# Patient Record
Sex: FEMALE | Race: WHITE | Employment: OTHER | URBAN - METROPOLITAN AREA
[De-identification: names, ages, dates, MRNs, and addresses within clinical notes are randomized per-mention and may not be internally consistent; named-entity substitution may affect disease eponyms.]

---

## 2019-11-11 ENCOUNTER — HOSPITAL ENCOUNTER (OUTPATIENT)
Dept: PHYSICAL THERAPY | Age: 64
Discharge: HOME | End: 2019-11-11
Payer: MEDICARE

## 2019-11-11 PROCEDURE — 97162 PT EVAL MOD COMPLEX 30 MIN: CPT

## 2019-11-11 PROCEDURE — 97140 MANUAL THERAPY 1/> REGIONS: CPT

## 2019-11-11 NOTE — PROGRESS NOTES
[facility] PHYSICAL THERAPY  [address] [place] [address] 51, Sena Proffer 201,Valentina Gavin, [address] [facility] [address] - Phone: [phone]  Fax: [phone] / [phone] [facility]  Patient Name: Pk Waggoner : 1955   Medical   Diagnosis: Low back pain [M54.5] Treatment Diagnosis: Low back pain [M54.5]   Onset Date: chronic     Referral Source: Juan J Parsons, * Start of Care [facility]): 2019   Prior Hospitalization: See medical history Provider #: [id number]   Prior Level of Function: Difficulty with prolonged walking/standing   Comorbidities: OA, HTN   Medications: Verified on Patient Summary List   The Plan of Care and following information is based on the information from the initial evaluation.   ===========================================================================================  Assessment / key information:  Pk Waggoner is a 75 y.o.  yo female with Dx of Low back pain [M54.5]. She reports chronic Hx of LBP. She currently rates her pain as 8/10 at worst, 0/10 at best, primarily located at lower lumbar region and posterolateral aspect of her R hip. She complains of difficulty and increase pain with prolonged walking and standing. .  Objective Findings:  Lumbar ROM: Flx  = limited by 30%, Ext = limited by 30%, Rot: R = limited by 30%, L = limited by 20%. Manual Muscle Testing: L hip abd and B hip ext are Reduced. Lumbar/SI Screening:  L iliosacral ant innominate, L2-4 L ERS, L on L SI innominate noted. Palpation:  Significantly increased soft tissue tension noted at B rectus femoris.  Pt instructed in HEP and will f/u in clinic for PT.  ===========================================================================================  Eval Complexity: History MEDIUM  Complexity : 1-2 comorbidities / personal factors will impact the outcome/ POC ;  Examination  MEDIUM Complexity : 3 Standardized tests and measures addressing body structure, function, activity limitation and / or participation in recreation ; Presentation MEDIUM Complexity : Evolving with changing characteristics ; Decision Making MEDIUM Complexity : FOTO score of 26-74; Overall Complexity MEDIUM  Problem List: pain affecting function, decrease ROM, decrease strength, decrease ADL/ functional abilitiies, decrease activity tolerance and decrease flexibility/ joint mobility   Treatment Plan may include any combination of the following: Therapeutic exercise, Therapeutic activities, Neuromuscular re-education, Physical agent/modality, Manual therapy, Patient education, Self Care training and Functional mobility training  Patient / Family readiness to learn indicated by: asking questions  Persons(s) to be included in education: patient (P)  Barriers to Learning/Limitations: None  Measures taken, if barriers to learning:    Patient Goal (s): Decrease pain    Patient self reported health status: good  Rehabilitation Potential: good     Short Term Goals: To be accomplished in  1-2  weeks:  1. Independent with HEP. 2. Decrease max pain 25-50% to assist with ADLs   Long Term Goals: To be accomplished in  3-4  weeks:  1. Decrease max pain 50-75% to assist with ADLs  2. Increase FOTO score to 63% to show functional improvment. 3.  Will rate  >/= +5 on Global Rating of Change and be prepared to DC to HEP. Frequency / Duration:   Patient to be seen  2-3  times per week for 3-4  weeks:  Patient / Caregiver education and instruction: self care and exercises    Therapist Signature: Joe Andrea, ANNEMARIE, OCS, SCS, CSCS Date: 31/21/7850   Certification Period: 11/11/19 - 2/8/20 Time: 12:21 PM   ===========================================================================================  I certify that the above Physical Therapy Services are being furnished while the patient is under my care. I agree with the treatment plan and certify that this therapy is necessary.     Physician Signature:        Date:       Time:     Please sign and return to [facility] or you may fax the signed copy to [phone]. Thank you.

## 2019-11-11 NOTE — PROGRESS NOTES
PHYSICAL THERAPY - DAILY TREATMENT NOTE    Patient Name: Michelle Cedillo        Date: 2019  : 1955   YES Patient  Verified  Visit #:     Insurance: Payor: [place] MEDICARE / Plan: [place] MEDICARE PART A & B / Product Type: Medicare /      In time: 11:30 Out time: 12:10   Total Treatment Time: 40     Medicare Time Tracking (below)   Total Timed Codes (min):  20 1:1 Treatment Time:  20     TREATMENT AREA =  Low back pain [M54.5]    SUBJECTIVE  Pain Level (on 0 to 10 scale):    Medication Changes/New allergies or changes in medical history, any new surgeries or procedures? NO    If yes, update Summary List   Subjective Functional Status/Changes:  []  No changes reported     SEE IE          OBJECTIVE      15 min Manual Therapy: CT and TL junction mob, L2-4 L ERS, L IS ant inn, L on L SI delfino   Rationale:      decrease pain, increase ROM and increase tissue extensibility to improve patient's ability to perform ADLs    5 min Self Care: Post pelvic tilt   Rationale:    increase ROM, increase strength and improve coordination to improve the patients ability to perform ADL      Billed With/As:   [] TE   [] TA   [] Neuro   [x] Self Care Patient Education: [x] Review HEP    [] Progressed/Changed HEP based on:   [] positioning   [] body mechanics   [] transfers   [] heat/ice application    [] other:       min Patient Education:  YES  Reviewed HEP   []  Progressed/Changed HEP based on:         Other Objective/Functional Measures:    SEE IE     Post Treatment Pain Level (on 0 to 10) scale:       ASSESSMENT  Assessment/Changes in Function:     SEE IE     []  See Progress Note/Recertification   Patient will continue to benefit from skilled PT services to modify and progress therapeutic interventions, address functional mobility deficits, address ROM deficits, address strength deficits, analyze and address soft tissue restrictions, analyze and cue movement patterns, analyze and modify body mechanics/ergonomics and assess and modify postural abnormalities to attain remaining goals.    Progress toward goals / Updated goals:         PLAN  []  Upgrade activities as tolerated YES Continue plan of care   []  Discharge due to :    []  Other:      Therapist: Rain Collier PT, OCS, SCS, CSCS    Date: 11/11/2019 Time: 12:18 PM       Future Appointments   Date Time Provider Ketan Childs   11/20/2019  8:30 AM Obdulia Ryan PT [facility]   11/22/2019  2:30 PM Obdulia Ryan PT [facility]   12/2/2019  9:30 AM Obdulia Ryan PT [facility]   12/4/2019  9:30 AM Obdulia Ryan PT [facility]   12/9/2019  9:30 AM Obdulia Ryan PT [facility]   12/11/2019  9:30 AM Obdulia Ryan PT [facility]   12/16/2019  9:30 AM Obdulia Ryan, PT [facility]

## 2019-11-20 ENCOUNTER — HOSPITAL ENCOUNTER (OUTPATIENT)
Dept: PHYSICAL THERAPY | Age: 64
Discharge: HOME | End: 2019-11-20
Payer: MEDICARE

## 2019-11-20 PROCEDURE — 97140 MANUAL THERAPY 1/> REGIONS: CPT

## 2019-11-20 PROCEDURE — 97110 THERAPEUTIC EXERCISES: CPT

## 2019-11-20 NOTE — PROGRESS NOTES
PHYSICAL THERAPY - DAILY TREATMENT NOTE    Patient Name: Meaghan Mijares        Date: 2019  : 1955   yes Patient  Verified  Visit #:      of   12  Insurance: Payor: Michelle So / Plan: VA MEDICARE PART A & B / Product Type: Medicare /      In time: 8:25 Out time: 9:17   Total Treatment Time: 52     Medicare/BCBS Time Tracking (below)   Total Timed Codes (min):  52 1:1 Treatment Time:  30     TREATMENT AREA =  Low back pain [M54.5]  SUBJECTIVE  Pain Level (on 0 to 10 scale):  1  / 10   Medication Changes/New allergies or changes in medical history, any new surgeries or procedures?    no  If yes, update Summary List   Subjective Functional Status/Changes:  []  No changes reported     My foot hurt for a few days after the last visit, but Im fine now          OBJECTIVE      32 min Therapeutic Exercise:  [x]  See flow sheet   Rationale:      increase ROM, increase strength and improve coordination to improve the patients ability to perform ADLs     20 min Manual Therapy: CT and TL junction mob, L2-4 L ERS, L IS ant inn, L on L SI delfino, DTM B RF   Rationale:      decrease pain, increase ROM and increase tissue extensibility to improve patient's ability to perform ADLs      Billed With/As:   [] TE   [] TA   [] Neuro   [] Self Care Patient Education: [x] Review HEP    [] Progressed/Changed HEP based on:   [] positioning   [] body mechanics   [] transfers   [] heat/ice application    [] other:      Other Objective/Functional Measures:    Cont to presents with ant pelvic tilt     Post Treatment Pain Level (on 0 to 10) scale:   0  / 10     ASSESSMENT  Assessment/Changes in Function:     Cont to have difficulty engaging glute max      []  See Progress Note/Recertification   Patient will continue to benefit from skilled PT services to modify and progress therapeutic interventions, address functional mobility deficits and address ROM deficits to attain remaining goals.    Progress toward goals / Updated goals:    Independent with HEP     PLAN  [x]  Upgrade activities as tolerated yes Continue plan of care   []  Discharge due to :    []  Other:      Therapist: Dorene Reece PT    Date: 11/20/2019 Time: 6:28 AM     Future Appointments   Date Time Provider Ketan Childs   11/20/2019  8:30 AM Andrew Poole, PT [facility]   11/22/2019  2:30 PM Andrew Poole, PT [facility]   12/2/2019  9:30 AM Andrew Poole, PT [facility]   12/4/2019  9:30 AM Andrew Poole, PT [facility]   12/9/2019  9:30 AM Andrew Poole, PT [facility]   12/11/2019  9:30 AM Andrew Poole, PT [facility]   12/16/2019  9:30 AM Andrew Poole, PT [facility]

## 2019-11-22 ENCOUNTER — APPOINTMENT (OUTPATIENT)
Dept: PHYSICAL THERAPY | Age: 64
End: 2019-11-22
Payer: MEDICARE

## 2019-12-02 ENCOUNTER — HOSPITAL ENCOUNTER (OUTPATIENT)
Dept: PHYSICAL THERAPY | Age: 64
Discharge: HOME | End: 2019-12-02
Payer: MEDICARE

## 2019-12-02 PROCEDURE — 97140 MANUAL THERAPY 1/> REGIONS: CPT

## 2019-12-02 PROCEDURE — 97110 THERAPEUTIC EXERCISES: CPT

## 2019-12-02 NOTE — PROGRESS NOTES
PHYSICAL THERAPY - DAILY TREATMENT NOTE    Patient Name: Consuelo Mar        Date: 2019  : 1955   yes Patient  Verified  Visit #:   3   of   12  Insurance: Payor: Michael Snare / Plan: VA MEDICARE PART A & B / Product Type: Medicare /      In time: 9:25 Out time: 9:55   Total Treatment Time: 30     Medicare/BCBS Time Tracking (below)   Total Timed Codes (min):  30 1:1 Treatment Time:  30     TREATMENT AREA =  Low back pain [M54.5]  SUBJECTIVE  Pain Level (on 0 to 10 scale):  5   10   Medication Changes/New allergies or changes in medical history, any new surgeries or procedures?    no  If yes, update Summary List   Subjective Functional Status/Changes:  []  No changes reported     Its not really the pain, its the stiffness          OBJECTIVE  15 min Therapeutic Exercise:  [x]? See flow sheet   Rationale:      increase ROM, increase strength and improve coordination to improve the patients ability to perform ADLs      15 min Manual Therapy: L2-4 L ERS, L IS ant inn, L on L SI delfino, DTM B RF, IASTM para   Rationale:      decrease pain, increase ROM and increase tissue extensibility to improve patient's ability to perform ADLs         Billed With/As:   [] TE   [] TA   [] Neuro   [] Self Care Patient Education: [x] Review HEP    [] Progressed/Changed HEP based on:   [] positioning   [] body mechanics   [] transfers   [] heat/ice application    [] other:      Other Objective/Functional Measures:    Cont to have L IS ant inn     Post Treatment Pain Level (on 0 to 10) scale:   3  / 10     ASSESSMENT  Assessment/Changes in Function:     Decreased stiffness with transfer after man Rx     []  See Progress Note/Recertification   Patient will continue to benefit from skilled PT services to modify and progress therapeutic interventions, address functional mobility deficits, address ROM deficits and address strength deficits to attain remaining goals.    Progress toward goals / Updated goals:    Slow progress with symptom reduction      PLAN  [x]  Upgrade activities as tolerated yes Continue plan of care   []  Discharge due to :    []  Other:      Therapist: Aylin Avila PT    Date: 12/2/2019 Time: 6:28 AM     Future Appointments   Date Time Provider Ketan Childs   12/2/2019  9:30 AM Margarito Polk, PT [facility]   12/4/2019  9:30 AM Margarito Polk, PT [facility]   12/9/2019  9:30 AM Margarito Polk, PT [facility]   12/11/2019  9:30 AM Margarito Polk, PT [facility]   12/16/2019  9:30 AM Margarito Polk, PT [facility]   12/20/2019 10:30 AM Margarito Polk, PT [facility]

## 2019-12-04 ENCOUNTER — HOSPITAL ENCOUNTER (OUTPATIENT)
Dept: PHYSICAL THERAPY | Age: 64
Discharge: HOME | End: 2019-12-04
Payer: MEDICARE

## 2019-12-04 PROCEDURE — 97140 MANUAL THERAPY 1/> REGIONS: CPT

## 2019-12-09 ENCOUNTER — HOSPITAL ENCOUNTER (OUTPATIENT)
Dept: PHYSICAL THERAPY | Age: 64
Discharge: HOME | End: 2019-12-09
Payer: MEDICARE

## 2019-12-09 PROCEDURE — 97140 MANUAL THERAPY 1/> REGIONS: CPT

## 2019-12-09 NOTE — PROGRESS NOTES
[address] [place] [address], [address] [place] [address] - Phone: [phone]  Fax: [phone] OF CARE/RECERTIFICATION FOR PHYSICAL THERAPY          Patient Name: Rojelio Dobson : 1955   Treatment/Medical Diagnosis: Low back pain [M54.5]   Onset Date: chronic    Referral Source: Linda Caicedo, * Start of Care [facility]): 19   Prior Hospitalization: See Medical History Provider #: [id number]   Prior Level of Function: Difficulty with prolonged walking/standing   Comorbidities:  OA, HTN      Medications: Verified on Patient Summary List   Visits from [facility]: 5 Missed Visits: 0     Goal/Measure of Progress Goal Met? 1. Decrease max pain by 50-75% to assist with ADLs   Status at last Eval: 8/10 Current Status: 8/10 no   2. Increase FOTO score to 63% to show functional improvment   Status at last Eval: 54 Current Status: 50 no   3. Will rate >/= +5 on Global Rating of Change and be prepared to DC to HEP. Status at last Eval: na Current Status: +1 progressing     Key Functional Changes/Progress: Ms. Solange London has had a good tolerance to PT treatment. She has only been seen for 4 follow up visits in the last 4 wks due to the holiday schedule. She reports overall improvement in her mobility. She continues to present with limited thoracic mobility and anterior pelvic tilt and complain of difficulty and increase in pain with prolonged ambulation.     Problem List: pain affecting function, decrease ROM, decrease strength, edema affecting function, impaired gait/ balance, decrease ADL/ functional abilitiies, decrease activity tolerance and decrease flexibility/ joint mobility   Treatment Plan may include any combination of the following: Therapeutic exercise, Therapeutic activities, Neuromuscular re-education, Gait/balance training, Manual therapy, Patient education, Self Care training and Functional mobility training  Patient Goal(s) has been updated and includes:      Goals for this certification period include and are to be achieved in   4  weeks:  Continue with all goals above  Frequency / Duration:   Patient to be seen   2   times per week for   4    weeks:    Assessments/Recommendations: continue with POC    If you have any questions/comments please contact us directly at [phone]. Thank you for allowing us to assist in the care of your patient. Therapist Signature: Neli Phillips PT Date: 58/[birth year]   Certification Period:  Reporting Period: 11/11/19 - 2/8/20 11/11/19 - 12/11/19 Time: 11:49 AM   NOTE TO PHYSICIAN:  PLEASE COMPLETE THE ORDERS BELOW AND FAX TO   [facility] Physical Therapy: [phone]  If you are unable to process this request in 24 hours please contact our office: [phone]    ___ I have read the above report and request that my patient continue as recommended.   ___ I have read the above report and request that my patient continue therapy with the following changes/special instructions: ________________________________________________   ___ I have read the above report and request that my patient be discharged from therapy.      Physician Signature:        Date:       Time:

## 2019-12-09 NOTE — PROGRESS NOTES
PHYSICAL THERAPY - DAILY TREATMENT NOTE    Patient Name: Brooke James        Date: 2019  : 1955   yes Patient  Verified  Visit #:     Insurance: Payor: Idolina Babinski / Plan: VA MEDICARE PART A & B / Product Type: Medicare /      In time: 9:30 Out time: 10:10   Total Treatment Time: 40     Medicare/BCBS Time Tracking (below)   Total Timed Codes (min):  40 1:1 Treatment Time:  30     TREATMENT AREA =  Low back pain [M54.5]  SUBJECTIVE  Pain Level (on 0 to 10 scale):  8  / 10   Medication Changes/New allergies or changes in medical history, any new surgeries or procedures?    no  If yes, update Summary List   Subjective Functional Status/Changes:  []  No changes reported     Its still 8/10 at the worst, but my mobility is much better. I can walk smoother. OBJECTIVE  15 min Therapeutic Exercise:  [x]? ??  See flow sheet   Rationale:      increase ROM, increase strength and improve coordination to improve the patients ability to perform ADLs      25 min Manual Therapy: Shot gun tech,  L IS ant inn, L on R SI delfino, DTM B RF, IASTM para   Rationale:      decrease pain, increase ROM and increase tissue extensibility to improve patient's ability to perform ADLs         Billed With/As:   [] TE   [] TA   [] Neuro   [] Self Care Patient Education: [x] Review HEP    [] Progressed/Changed HEP based on:   [] positioning   [] body mechanics   [] transfers   [] heat/ice application    [] other:      Other Objective/Functional Measures:    FOTO = 50  GROC = +1     Post Treatment Pain Level (on 0 to 10) scale:   5  / 10     ASSESSMENT  Assessment/Changes in Function:     Decreased stiffness with amb after man Rx     []  See Progress Note/Recertification   Patient will continue to benefit from skilled PT services to modify and progress therapeutic interventions, address functional mobility deficits and address ROM deficits to attain remaining goals.    Progress toward goals / Updated goals:    Progressing slowly with function      PLAN  [x]  Upgrade activities as tolerated yes Continue plan of care   []  Discharge due to :    []  Other:      Therapist: Hossein Piña PT    Date: 12/9/2019 Time: 6:29 AM     Future Appointments   Date Time Provider Ketan Childs   12/9/2019  9:30 AM [facility]   12/11/2019  9:30 AM [facility]   12/16/2019  9:30 AM [facility]   12/20/2019 10:30 AM Barby Fofana, PT [facility]

## 2019-12-11 ENCOUNTER — HOSPITAL ENCOUNTER (OUTPATIENT)
Dept: PHYSICAL THERAPY | Age: 64
Discharge: HOME | End: 2019-12-11
Payer: MEDICARE

## 2019-12-11 PROCEDURE — 97140 MANUAL THERAPY 1/> REGIONS: CPT

## 2019-12-11 NOTE — PROGRESS NOTES
PHYSICAL THERAPY - DAILY TREATMENT NOTE    Patient Name: Michelle Cedillo        Date: 2019  : 1955   yes Patient  Verified  Visit #:     Insurance: Payor: Rudi Florence / Plan: VA MEDICARE PART A & B / Product Type: Medicare /      In time: 9:35 Out time: 10:20   Total Treatment Time: 45     Medicare/BCBS Time Tracking (below)   Total Timed Codes (min):  45 1:1 Treatment Time:  30     TREATMENT AREA =  Low back pain [M54.5]  SUBJECTIVE  Pain Level (on 0 to 10 scale):  5  / 10   Medication Changes/New allergies or changes in medical history, any new surgeries or procedures?    no  If yes, update Summary List   Subjective Functional Status/Changes:  []  No changes reported     Much better than last time. OBJECTIVE  20 min Therapeutic Exercise:  [x]? ???  See flow sheet   Rationale:      increase ROM, increase strength and improve coordination to improve the patients ability to perform ADLs      25 min Manual Therapy: Shot gun tech,  L IS ant inn, L on R SI delfino, DTM [place], IASTM para   Rationale:      decrease pain, increase ROM and increase tissue extensibility to improve patient's ability to perform ADLs         Billed With/As:   [] TE   [] TA   [] Neuro   [] Self Care Patient Education: [x] Review HEP    [] Progressed/Changed HEP based on:   [] positioning   [] body mechanics   [] transfers   [] heat/ice application    [] other:      Other Objective/Functional Measures:    Able to perform bridge today without HS cramp     Post Treatment Pain Level (on 0 to 10) scale:   2  / 10     ASSESSMENT  Assessment/Changes in Function:     No pain with sit to stand transfer after man Rx today     []  See Progress Note/Recertification   Patient will continue to benefit from skilled PT services to modify and progress therapeutic interventions, address functional mobility deficits, address ROM deficits and address strength deficits to attain remaining goals.    Progress toward goals / Updated goals:    slowly progressing with pain reduction      PLAN  [x]  Upgrade activities as tolerated yes Continue plan of care   []  Discharge due to :    []  Other:      Therapist: Fede Skinner PT    Date: 12/11/2019 Time: 6:29 AM     Future Appointments   Date Time Provider Ketan Childs   12/11/2019  9:30 AM Mckenzie Milan, PT [facility]   12/16/2019  9:30 AM Mckenzie Milan PT [facility]   12/20/2019 10:30 AM Mckenzie Milan, PT [id number]

## 2019-12-16 ENCOUNTER — HOSPITAL ENCOUNTER (OUTPATIENT)
Dept: PHYSICAL THERAPY | Age: 64
Discharge: HOME | End: 2019-12-16
Payer: MEDICARE

## 2019-12-16 PROCEDURE — 97140 MANUAL THERAPY 1/> REGIONS: CPT

## 2019-12-16 NOTE — PROGRESS NOTES
PHYSICAL THERAPY - DAILY TREATMENT NOTE    Patient Name: Rio Earing        Date: 2019  : 1955   yes Patient  Verified  Visit #:     Insurance: Payor: Surya No / Plan: VA MEDICARE PART A & B / Product Type: Medicare /      In time: 9:30 Out time: 10:23   Total Treatment Time: 53     Medicare/BCBS Time Tracking (below)   Total Timed Codes (min):  53 1:1 Treatment Time:  30     TREATMENT AREA =  Low back pain [M54.5]  SUBJECTIVE  Pain Level (on 0 to 10 scale):  4  / 10   Medication Changes/New allergies or changes in medical history, any new surgeries or procedures?    no  If yes, update Summary List   Subjective Functional Status/Changes:  []  No changes reported     I can say its definitely better. Im standing up straighter          OBJECTIVE  23 min Therapeutic Exercise:  [x]? ????  See flow sheet   Rationale:      increase ROM, increase strength and improve coordination to improve the patients ability to perform ADLs      30 min Manual Therapy: Shot gun tech,  L IS ant inn,DTM B RF, IASTM para   Rationale:      decrease pain, increase ROM and increase tissue extensibility to improve patient's ability to perform ADLs         Billed With/As:   [] TE   [] TA   [] Neuro   [] Self Care Patient Education: [x] Review HEP    [] Progressed/Changed HEP based on:   [] positioning   [] body mechanics   [] transfers   [] heat/ice application    [] other:      Other Objective/Functional Measures:    Cont to have increased soft tissue tension noted at L para     Post Treatment Pain Level (on 0 to 10) scale:   3   10     ASSESSMENT  Assessment/Changes in Function:     Improved up right posture noted. []  See Progress Note/Recertification   Patient will continue to benefit from skilled PT services to modify and progress therapeutic interventions, address functional mobility deficits and address ROM deficits to attain remaining goals.    Progress toward goals / Updated goals:    Slowly progressing with function      PLAN  [x]  Upgrade activities as tolerated yes Continue plan of care   []  Discharge due to :    []  Other:      Therapist: Armani Torres PT    Date: 12/16/2019 Time: 6:29 AM     Future Appointments   Date Time Provider Ketan Childs   12/16/2019  9:30 AM Montse Lerner, PT [facility]   12/20/2019 10:30 AM Montse Lerner, PT [id number]

## 2019-12-20 ENCOUNTER — HOSPITAL ENCOUNTER (OUTPATIENT)
Dept: PHYSICAL THERAPY | Age: 64
Discharge: HOME | End: 2019-12-20
Payer: MEDICARE

## 2019-12-20 PROCEDURE — 97110 THERAPEUTIC EXERCISES: CPT

## 2019-12-20 PROCEDURE — 97140 MANUAL THERAPY 1/> REGIONS: CPT

## 2019-12-30 ENCOUNTER — HOSPITAL ENCOUNTER (OUTPATIENT)
Dept: PHYSICAL THERAPY | Age: 64
Discharge: HOME | End: 2019-12-30
Payer: MEDICARE

## 2019-12-30 PROCEDURE — 97140 MANUAL THERAPY 1/> REGIONS: CPT

## 2019-12-30 NOTE — PROGRESS NOTES
PHYSICAL THERAPY - DAILY TREATMENT NOTE    Patient Name: Ant Quiles        Date: 2019  : 1955   yes Patient  Verified  Visit #:     Insurance: Payor: Jessie Jiménez / Plan: [place] MEDICARE PART A & B / Product Type: Medicare /      In time: 9:25 Out time: 10:15   Total Treatment Time: 50     Medicare/BCBS Time Tracking (below)   Total Timed Codes (min):  50 1:1 Treatment Time:  25     TREATMENT AREA =  Low back pain [M54.5]  SUBJECTIVE  Pain Level (on 0 to 10 scale):  4  / 10   Medication Changes/New allergies or changes in medical history, any new surgeries or procedures?    no  If yes, update Summary List   Subjective Functional Status/Changes:  []  No changes reported     Not sure what I did, but its hurting a little           OBJECTIVE  15 min Therapeutic Exercise:  [x]???????  See flow sheet   Rationale:      increase ROM, increase strength and improve coordination to improve the patients ability to perform ADLs      25 min Manual Therapy: Shot gun tech,  L IS ant inn, L3-5 L FRS delfino, DTM B RF, IASTM para   Rationale:      decrease pain, increase ROM and increase tissue extensibility to improve patient's ability to perform ADLs    Billed With/As:   [] TE   [] TA   [] Neuro   [] Self Care Patient Education: [x] Review HEP    [] Progressed/Changed HEP based on:   [] positioning   [] body mechanics   [] transfers   [] heat/ice application    [] other:      Other Objective/Functional Measures:    Cont to have increased soft tissue tension noted at B RF     Post Treatment Pain Level (on 0 to 10) scale:   2-3  / 10     ASSESSMENT  Assessment/Changes in Function:     Near full t/s ROt noted after man Rx     []  See Progress Note/Recertification   Patient will continue to benefit from skilled PT services to modify and progress therapeutic interventions, address functional mobility deficits and address ROM deficits to attain remaining goals.    Progress toward goals / Updated goals:    Slow progress with pain reduction      PLAN  [x]  Upgrade activities as tolerated yes Continue plan of care   []  Discharge due to :    []  Other:      Therapist: Kimmie Nickerson PT    Date: 12/30/2019 Time: 6:29 AM     Future Appointments   Date Time Provider Ketan Childs   12/30/2019  9:30 AM Teodoro Hall, JEREMY [facility]

## 2020-01-03 ENCOUNTER — HOSPITAL ENCOUNTER (OUTPATIENT)
Dept: PHYSICAL THERAPY | Age: 65
Discharge: HOME | End: 2020-01-03
Payer: MEDICARE

## 2020-01-03 PROCEDURE — 97140 MANUAL THERAPY 1/> REGIONS: CPT

## 2020-01-03 NOTE — PROGRESS NOTES
PHYSICAL THERAPY - DAILY TREATMENT NOTE    Patient Name: Maryruth Olszewski        Date: 1/3/2020  : 1955   yes Patient  Verified  Visit #:   10   of   12  Insurance: Payor: Melodie Ramos / Plan: [place] MEDICARE PART A & B / Product Type: Medicare /      In time: 3:00 Out time: 4:00   Total Treatment Time: 60     Medicare/BCBS Time Tracking (below)   Total Timed Codes (min):  60 1:1 Treatment Time:  60     TREATMENT AREA =  Low back pain [M54.5]  SUBJECTIVE  Pain Level (on 0 to 10 scale):  3   10   Medication Changes/New allergies or changes in medical history, any new surgeries or procedures?    no  If yes, update Summary List   Subjective Functional Status/Changes:  []  No changes reported     I feel like I can stand longer          OBJECTIVE  20 min Therapeutic Exercise:  [x]????????  See flow sheet   Rationale:      increase ROM, increase strength and improve coordination to improve the patients ability to perform ADLs      30 min Manual Therapy: Shot gun ANNE-MARIE olivas IS ant inn, DTM B RF, IASTM para   Rationale:      decrease pain, increase ROM and increase tissue extensibility to improve patient's ability to perform ADLs      Billed With/As:   [] TE   [] TA   [] Neuro   [] Self Care Patient Education: [x] Review HEP    [] Progressed/Changed HEP based on:   [] positioning   [] body mechanics   [] transfers   [] heat/ice application    [] other:      Other Objective/Functional Measures:    Decreased soft tissue tension noted at para     Post Treatment Pain Level (on 0 to 10) scale:   2  / 10     ASSESSMENT  Assessment/Changes in Function:     Good stability with all ex     []  See Progress Note/Recertification   Patient will continue to benefit from skilled PT services to modify and progress therapeutic interventions, address functional mobility deficits and address ROM deficits to attain remaining goals.    Progress toward goals / Updated goals:    Progressing with function      PLAN  [x]  Upgrade activities as tolerated yes Continue plan of care   []  Discharge due to :    []  Other:      Therapist: Sukdhev Romeo PT    Date: 1/3/2020 Time: 4:21 PM     Future Appointments   Date Time Provider Ketan Childs   1/10/2020 10:30 AM Lisbeth Montoya, PT [facility]   1/16/2020 10:00 AM Lisbeth Monotya, PT [facility]   1/21/2020 12:30 PM Lisbeth Montoya, PT [facility]   1/23/2020  9:30 AM Lisbeth Montoya, PT [facility]   1/28/2020 10:30 AM Lisbeth Montoya, PT [facility]   1/30/2020 10:00 AM Lisbeth Montoya, PT [facility]

## 2020-01-10 ENCOUNTER — APPOINTMENT (OUTPATIENT)
Dept: PHYSICAL THERAPY | Age: 65
End: 2020-01-10
Payer: MEDICARE

## 2020-01-16 ENCOUNTER — HOSPITAL ENCOUNTER (OUTPATIENT)
Dept: PHYSICAL THERAPY | Age: 65
Discharge: HOME | End: 2020-01-16
Payer: MEDICARE

## 2020-01-16 PROCEDURE — 97140 MANUAL THERAPY 1/> REGIONS: CPT

## 2020-01-16 NOTE — PROGRESS NOTES
[address] PHYSICAL THERAPY   [facility], [address],[place], [address] [facility] - Phone: [phone]  Fax: [phone] PHYSICAL THERAPY          Patient Name: Cullen Lopez : 1955   Treatment/Medical Diagnosis: Low back pain [M54.5]   Onset Date: chronic     Referral Source: Nancy Argueta, * Start of Care [facility]): 19   Prior Hospitalization: See Medical History Provider #: [id number]   Prior Level of Function: Difficulty with prolonged walking/standing   Comorbidities:   OA, HTN      Medications: Verified on Patient Summary List   Visits from [facility]: 10 Missed Visits: 0               Goal/Measure of Progress Goal Met? 1. Decrease max pain by 50-75% to assist with ADLs   Status at last Eval: 8/10 Current Status: 5-6 progressing   2. Increase FOTO score to 63% to show functional improvment   Status at last Eval: 54 Current Status: 57 progressing   3. Will rate >/= +5 on Global Rating of Change and be prepared to DC to HEP. Status at last Eval: na Current Status: +5 yes      Key Functional Changes/Progress: Ms. Chan Jurado has had a good tolerance to PT treatment. She has only been seen for 5 follow up visits in the last 4 wks due to the holiday schedule. She reports overall improvement in her mobility and improved tolerance for prolonged standing. She continues to present with  anterior pelvic tilt and complain of difficulty and increase in pain with prolonged ambulation.     Problem List: pain affecting function, decrease ROM, decrease strength, edema affecting function, impaired gait/ balance, decrease ADL/ functional abilitiies, decrease activity tolerance and decrease flexibility/ joint mobility                 Treatment Plan may include any combination of the following: Therapeutic exercise, Therapeutic activities, Neuromuscular re-education, Gait/balance training, Manual therapy, Patient education, Self Care training and Functional mobility training  Patient Goal(s) has been updated and includes:           Goals for this certification period include and are to be achieved in   4  weeks:  Continue with goals  #1 and #2 above  Frequency / Duration:   Patient to be seen   2   times per week for   4    weeks:    Assessments/Recommendations: cont with current POC    If you have any questions/comments please contact us directly at [phone]. Thank you for allowing us to assist in the care of your patient. Therapist Signature: Coretta Johnson PT Date: 2/54/98   Certification Period:  Reporting Period: 11/11/19 - 2/8/20 12/11/19 - 1/10/20 Time: 8:58 AM   NOTE TO PHYSICIAN:  PLEASE COMPLETE THE ORDERS BELOW AND FAX TO   [facility]: [phone]  If you are unable to process this request in 24 hours please contact our office: [phone]    ___ I have read the above report and request that my patient continue as recommended.   ___ I have read the above report and request that my patient continue therapy with the following changes/special instructions: ________________________________________________   ___ I have read the above report and request that my patient be discharged from therapy.      Physician Signature:        Date:       Time:

## 2020-01-16 NOTE — PROGRESS NOTES
PHYSICAL THERAPY - DAILY TREATMENT NOTE    Patient Name: Lilia Reveal        Date: 2020  : 1955   yes Patient  Verified  Visit #:     Insurance: Payor: Melchor Jones / Plan: [place] MEDICARE PART A & B / Product Type: Medicare /      In time: 10:05 Out time: 11:00   Total Treatment Time: 55     Medicare/BCBS Time Tracking (below)   Total Timed Codes (min):  55 1:1 Treatment Time:  30     TREATMENT AREA =  Low back pain [M54.5]  SUBJECTIVE  Pain Level (on 0 to 10 scale):  3  / 10   Medication Changes/New allergies or changes in medical history, any new surgeries or procedures?    no  If yes, update Summary List   Subjective Functional Status/Changes:  []  No changes reported     It is better, but when Im not here for a while, I can tell the difference.  5-6/10 at the worst, but I can walk longer           OBJECTIVE  20 min Therapeutic Exercise:  [x]?????????  See flow sheet   Rationale:      increase ROM, increase strength and improve coordination to improve the patients ability to perform ADLs      30 min Manual Therapy: DTM para, RF,    Rationale:      decrease pain, increase ROM and increase tissue extensibility to improve patient's ability to perform ADLs         Billed With/As:   [] TE   [] TA   [] Neuro   [] Self Care Patient Education: [x] Review HEP    [] Progressed/Changed HEP based on:   [] positioning   [] body mechanics   [] transfers   [] heat/ice application    [] other:      Other Objective/Functional Measures:    FOTO= 57  GROC = +5     Post Treatment Pain Level (on 0 to 10) scale:   2  / 10     ASSESSMENT  Assessment/Changes in Function:     Decreased pain with amb after man Rx     []  See Progress Note/Recertification   Patient will continue to benefit from skilled PT services to modify and progress therapeutic interventions, address functional mobility deficits and address ROM deficits to attain remaining goals.    Progress toward goals / Updated goals:    Slowly progressing with function     PLAN  [x]  Upgrade activities as tolerated yes Continue plan of care   []  Discharge due to :    []  Other:      Therapist: Helaine Osgood, PT    Date: 1/16/2020 Time: 6:31 AM     Future Appointments   Date Time Provider Ketan Childs   1/16/2020 10:00 AM Irene Marks, PT [facility]   1/21/2020 12:30 PM Irene Marks, PT [facility]   1/23/2020  9:30 AM Irene Marks PT [facility]   1/28/2020 10:30 AM Irene Marks, PT [facility]   1/30/2020 10:00 AM Irene Marks, PT [facility]

## 2020-01-21 ENCOUNTER — HOSPITAL ENCOUNTER (OUTPATIENT)
Dept: PHYSICAL THERAPY | Age: 65
Discharge: HOME | End: 2020-01-21
Payer: MEDICARE

## 2020-01-21 PROCEDURE — 97140 MANUAL THERAPY 1/> REGIONS: CPT

## 2020-01-21 NOTE — PROGRESS NOTES
PHYSICAL THERAPY - DAILY TREATMENT NOTE    Patient Name: Marcia Faith        Date: 2020  : 1955   yes Patient  Verified  Visit #:     Insurance: Payor: Hardeep Yakov / Plan: [place] MEDICARE PART A & B / Product Type: Medicare /      In time: 12:30 Out time: 1:00   Total Treatment Time: 30     Medicare/BCBS Time Tracking (below)   Total Timed Codes (min):  30 1:1 Treatment Time:  30     TREATMENT AREA =  Low back pain [M54.5]  SUBJECTIVE  Pain Level (on 0 to 10 scale):  3  / 10   Medication Changes/New allergies or changes in medical history, any new surgeries or procedures?    no  If yes, update Summary List   Subjective Functional Status/Changes:  []  No changes reported     Walking has gotten easier          OBJECTIVE    30 min Manual Therapy: DTM para, RF, shot gun tech   Rationale:      decrease pain, increase ROM and increase tissue extensibility to improve patient's ability to perform ADLs      Billed With/As:   [] TE   [] TA   [] Neuro   [] Self Care Patient Education: [x] Review HEP    [] Progressed/Changed HEP based on:   [] positioning   [] body mechanics   [] transfers   [] heat/ice application    [] other:      Other Objective/Functional Measures:    Cont to have increased soft tissue tension noted at L RF     Post Treatment Pain Level (on 0 to 10) scale:   2  / 10     ASSESSMENT  Assessment/Changes in Function:     Decreased pain with sit to stand transfer     []  See Progress Note/Recertification   Patient will continue to benefit from skilled PT services to modify and progress therapeutic interventions, address functional mobility deficits and address ROM deficits to attain remaining goals.    Progress toward goals / Updated goals:    Progressing slowly with pain reduction      PLAN  [x]  Upgrade activities as tolerated yes Continue plan of care   []  Discharge due to :    []  Other:      Therapist: Hever Mars, PT    Date: 2020 Time: 10:56 AM     Future Appointments Date Time Provider Ketan Naz   1/21/2020 12:30 PM Colin Esquivel, PT [facility]   1/23/2020  9:30 AM Colin Esquivel, PT [facility]   2/4/2020 12:00 PM Colin Esquivel, PT [facility]   2/6/2020  9:00 AM Colin Esquivel, PT [facility]

## 2020-01-23 ENCOUNTER — HOSPITAL ENCOUNTER (OUTPATIENT)
Dept: PHYSICAL THERAPY | Age: 65
Discharge: HOME | End: 2020-01-23
Payer: MEDICARE

## 2020-01-23 PROCEDURE — 97140 MANUAL THERAPY 1/> REGIONS: CPT

## 2020-01-23 NOTE — PROGRESS NOTES
PHYSICAL THERAPY - DAILY TREATMENT NOTE    Patient Name: Germaine Hollingsworth        Date: 2020  : 1955   yes Patient  Verified  Visit #:   15   of   24  Insurance: Payor: Yosi Mcallister / Plan: [place] MEDICARE PART A & B / Product Type: Medicare /      In time: 9:28 Out time: 10:13   Total Treatment Time: 45     Medicare/BCBS Time Tracking (below)   Total Timed Codes (min):  45 1:1 Treatment Time:  30     TREATMENT AREA =  Low back pain [M54.5]  SUBJECTIVE  Pain Level (on 0 to 10 scale):  1  / 10   Medication Changes/New allergies or changes in medical history, any new surgeries or procedures?    no  If yes, update Summary List   Subjective Functional Status/Changes:  []  No changes reported     Overall the walking is better          OBJECTIVE    20 min Therapeutic Exercise:  [x]??????????  See flow sheet   Rationale:      increase ROM, increase strength and improve coordination to improve the patients ability to perform ADLs      25 min Manual Therapy: DTM para, RF, shot gun tech   Rationale:      decrease pain, increase ROM and increase tissue extensibility to improve patient's ability to perform ADLs    Billed With/As:   [] TE   [] TA   [] Neuro   [] Self Care Patient Education: [x] Review HEP    [] Progressed/Changed HEP based on:   [] positioning   [] body mechanics   [] transfers   [] heat/ice application    [] other:      Other Objective/Functional Measures:    Cont to have increased soft tissue tension noted at B para     Post Treatment Pain Level (on 0 to 10) scale:   0  / 10     ASSESSMENT  Assessment/Changes in Function:     Decreased pain with sit to stand transfer     []  See Progress Note/Recertification   Patient will continue to benefit from skilled PT services to modify and progress therapeutic interventions, address functional mobility deficits and address ROM deficits to attain remaining goals.    Progress toward goals / Updated goals:    Slowly progressing with function      PLAN  [x]  Upgrade activities as tolerated yes Continue plan of care   []  Discharge due to :    []  Other:      Therapist: Jensen Layton PT    Date: 1/23/2020 Time: 6:30 AM     Future Appointments   Date Time Provider Ketan Childs   1/23/2020  9:30 AM [facility]   2/4/2020 12:00 PM [facility]   2/6/2020  9:00 AM Salo Peña, PT [facility]

## 2020-01-28 ENCOUNTER — APPOINTMENT (OUTPATIENT)
Dept: PHYSICAL THERAPY | Age: 65
End: 2020-01-28
Payer: MEDICARE

## 2020-01-30 ENCOUNTER — APPOINTMENT (OUTPATIENT)
Dept: PHYSICAL THERAPY | Age: 65
End: 2020-01-30
Payer: MEDICARE

## 2020-02-06 ENCOUNTER — HOSPITAL ENCOUNTER (OUTPATIENT)
Dept: PHYSICAL THERAPY | Age: 65
Discharge: HOME | End: 2020-02-06
Payer: MEDICARE

## 2020-02-06 PROCEDURE — 97140 MANUAL THERAPY 1/> REGIONS: CPT

## 2020-02-06 NOTE — PROGRESS NOTES
PHYSICAL THERAPY - DAILY TREATMENT NOTE    Patient Name: Livier Yun        Date: 2020  : 1955   yes Patient  Verified  Visit #:   15      24  Insurance: Payor: Brandi Godinez / Plan: VA MEDICARE PART A & B / Product Type: Medicare /      In time: 9:00 Out time: 9:50   Total Treatment Time: 50     Medicare/BCBS Time Tracking (below)   Total Timed Codes (min):  50 1:1 Treatment Time:  25     TREATMENT AREA =  Low back pain [M54.5]  SUBJECTIVE  Pain Level (on 0 to 10 scale):  3  / 10   Medication Changes/New allergies or changes in medical history, any new surgeries or procedures?    no  If yes, update Summary List   Subjective Functional Status/Changes:  []  No changes reported     My R hip started to hurt, but the back is fine.   4/10 at the worst.           OBJECTIVE      25 min Therapeutic Exercise:  [x]  See flow sheet   Rationale:      increase ROM, increase strength and improve coordination to improve the patients ability to perform ADLs     25 min Manual Therapy: DTM para, RF, shot gun tech   Rationale:      decrease pain, increase ROM and increase tissue extensibility to improve patient's ability to perform ADLs      Billed With/As:   [] TE   [] TA   [] Neuro   [] Self Care Patient Education: [x] Review HEP    [] Progressed/Changed HEP based on:   [] positioning   [] body mechanics   [] transfers   [] heat/ice application    [] other:      Other Objective/Functional Measures:    FOTO = 83  GROC = +7     Post Treatment Pain Level (on 0 to 10) scale:   0  / 10     ASSESSMENT  Assessment/Changes in Function:     See DC     []  See Progress Note/Recertification      Progress toward goals / Updated goals:    See DC     PLAN  []  Upgrade activities as tolerated  Continue plan of care   [x]  Discharge due to : Met all goals    []  Other:      Therapist: Sean Connell PT    Date: 2020 Time: 6:29 AM     Future Appointments   Date Time Provider Ketan Childs   2020  9:00 AM Mary Jo Pacheco Penny Colindres, PT [facility]

## 2020-02-11 NOTE — PROGRESS NOTES
[facility]  [address] [place] [address] [address], [address],[facility], [facility] - Phone: [phone]  Fax: [phone] FOR PHYSICAL THERAPY          Patient Name: Jaren Holman : 1955   Treatment/Medical Diagnosis: Low back pain [M54.5]   Onset Date: chronic     Referral Source: Janeth Persaud, * Start of Care [facility]): 19   Prior Hospitalization: See Medical History Provider #: [id number]   Prior Level of Function: Difficulty with prolonged walking/standing   Comorbidities:   OA, HTN      Medications: Verified on Patient Summary List   Visits from [facility]: 15 Missed Visits: 0                      Goal/Measure of Progress Goal Met? 1.  Decrease max pain by 50-75% to assist with ADLs   Status at last Eval: 8/10 Current Status: 4/10 yes   2.  Increase FOTO score to 63% to show functional improvment   Status at last Eval: 54 Current Status: 83 yes     Key Functional Changes/Progress: Ms. Sigrid Avila has had a good tolerance to PT treatment. Emma Arias has progressed well with her pain reduction and reports significant improvement with prolonged standing. She continues to present with  anterior pelvic tilt, however, we feel that she is able to continue to manage her symptom independently at this point. Assessments/Recommendations: Discontinue therapy. Progressing towards or have reached established goals. If you have any questions/comments please contact us directly at [phone]. Thank you for allowing us to assist in the care of your patient.     Therapist Signature: Coretta Johnson, PT, OCS, SCS, CSCS Date: 2020   Reporting Period: 1/10/20 - 20 Time: 1:45 PM

## 2020-03-05 ENCOUNTER — HOSPITAL ENCOUNTER (OUTPATIENT)
Dept: PHYSICAL THERAPY | Age: 65
Discharge: HOME | End: 2020-03-05
Payer: MEDICARE

## 2020-03-05 PROCEDURE — 97162 PT EVAL MOD COMPLEX 30 MIN: CPT

## 2020-03-05 PROCEDURE — 97140 MANUAL THERAPY 1/> REGIONS: CPT

## 2020-03-05 NOTE — PROGRESS NOTES
PHYSICAL THERAPY - DAILY TREATMENT NOTE    Patient Name: Matt Villarreal        Date: 3/5/2020  : 1955   YES Patient  Verified  Visit #:     Insurance: Payor: VA MEDICARE / Plan: VA MEDICARE PART A & B / Product Type: Medicare /      In time: 10:30 Out time: 11:10   Total Treatment Time: 40     Medicare Time Tracking (below)   Total Timed Codes (min):  20 1:1 Treatment Time:  20     TREATMENT AREA =  Right hip pain [M25.551]    SUBJECTIVE  Pain Level (on 0 to 10 scale):    Medication Changes/New allergies or changes in medical history, any new surgeries or procedures? NO    If yes, update Summary List   Subjective Functional Status/Changes:  []  No changes reported     SEE IE          OBJECTIVE    15 min Manual Therapy: DTM RF, TFL, ant Gm, shot gun tech, R IS ant inn delfino, cuboid whip on R   Rationale:      decrease pain, increase ROM and increase tissue extensibility to improve patient's ability to perform ADLs    5 min Self Care: B hip shift at wall   Rationale:    increase ROM, increase strength and improve coordination to improve the patients ability to perform ADLs      Billed With/As:   [] TE   [] TA   [] Neuro   [x] Self Care Patient Education: [x] Review HEP    [] Progressed/Changed HEP based on:   [] positioning   [] body mechanics   [] transfers   [] heat/ice application    [] other:       min Patient Education:  YES  Reviewed HEP   []  Progressed/Changed HEP based on:         Other Objective/Functional Measures:    SEE IE     Post Treatment Pain Level (on 0 to 10) scale:       ASSESSMENT  Assessment/Changes in Function:     SEE IE     []  See Progress Note/Recertification   Patient will continue to benefit from skilled PT services to modify and progress therapeutic interventions, address functional mobility deficits, address ROM deficits, address strength deficits, analyze and address soft tissue restrictions, analyze and cue movement patterns, analyze and modify body mechanics/ergonomics and assess and modify postural abnormalities to attain remaining goals.    Progress toward goals / Updated goals:         PLAN  []  Upgrade activities as tolerated YES Continue plan of care   []  Discharge due to :    []  Other:      Therapist: Charleen Baker, PT, OCS, SCS, CSCS    Date: 3/5/2020 Time: 1:35 PM       Future Appointments   Date Time Provider Ketan Childs   3/20/2020  1:00 PM [facility] Meter, PT [facility]   3/27/2020  1:00 PM Danella Meter, PT [facility]   4/3/2020  1:00 PM Danella Meter, PT [facility]   4/10/2020  1:00 PM Danella Meter, PT [facility]

## 2020-03-05 NOTE — PROGRESS NOTES
[address] [place] [address], [address] [facility] [address] - Phone: [phone]  Fax: [phone] OF CARE / [address] [facility]  Patient Name: Karl Hernandez : 1955   Medical   Diagnosis: Right hip pain [M25.551] Treatment Diagnosis: Right hip pain [M25.551]   Onset Date:      Referral Source: Teodoro Alpers, * Start of Care [facility]): 3/5/2020   Prior Hospitalization: See medical history Provider #: [id number]   Prior Level of Function: Pain free amb   Comorbidities: HTN, OA   Medications: Verified on Patient Summary List   The Plan of Care and following information is based on the information from the initial evaluation.   ===========================================================================================  Assessment / key information:  Karl Hernandez is a 75 y.o.  yo female with Dx of Right hip pain [M25.551]. She reports insidious onset of R hip pain in later . She currently rates her pain as 10/10 at worst, 0/10 at best, primarily located at R groin. She complains of difficulty and increase pain with hip flexion. Objective Findings:  Manual Muscle Testing:   B hip abd and ext are Reduced. Palpation:  Significant increase in soft tissue tension noted at ant glute med on R, R TFL and RF noted. R iliosacral anterior innominate. Pain at the end range hip flexion noted. Pt instructed in HEP and will f/u in clinic for PT.  ===========================================================================================  Eval Complexity: History MEDIUM  Complexity : 1-2 comorbidities / personal factors will impact the outcome/ POC ;  Examination  MEDIUM Complexity : 3 Standardized tests and measures addressing body structure, function, activity limitation and / or participation in recreation ; Presentation MEDIUM Complexity : Evolving with changing characteristics ;   Decision Making MEDIUM Complexity : FOTO score of 26-74; Overall Complexity MEDIUM  Problem List: pain affecting function, decrease ROM, decrease strength, impaired gait/ balance, decrease ADL/ functional abilitiies and decrease activity tolerance   Treatment Plan may include any combination of the following: Therapeutic exercise, Therapeutic activities, Neuromuscular re-education, Physical agent/modality, Manual therapy, Patient education, Self Care training and Functional mobility training  Patient / Family readiness to learn indicated by: asking questions  Persons(s) to be included in education: patient (P)  Barriers to Learning/Limitations: None  Measures taken, if barriers to learning:    Patient Goal (s): Decrease pain   Patient self reported health status: good  Rehabilitation Potential: good     Short Term Goals: To be accomplished in  1-2  weeks:  1. Independent with HEP. 2. Decrease max pain 25-50% to assist with ADLs   Long Term Goals: To be accomplished in  3-4  weeks:  1. Decrease max pain 50-75% to assist with ADLs  2. Increase FOTO score by10% to show functional improvment. 3.  Will rate  >/= +5 on Global Rating of Change and be prepared to DC to HEP. Frequency / Duration:   Patient to be seen  2-3  times per week for 3-4  weeks:  Patient / Caregiver education and instruction: self care and exercises    Therapist Signature: Charleen Baker, DPT, OCS, SCS, CSCS Date: 3/4/8484   Certification Period: 3/5/20 - 6/2/20 Time: 1:37 PM   ===========================================================================================  I certify that the above Physical Therapy Services are being furnished while the patient is under my care. I agree with the treatment plan and certify that this therapy is necessary. Physician Signature:        Date:       Time:     Please sign and return to [facility] or you may fax the signed copy to [phone]. Thank you.

## 2020-03-05 NOTE — PROGRESS NOTES
[address] [place] [address] [place] [address], [address] [place] [address] - Phone: [phone]  Fax: [phone]  Request for use of Dry Needling/Intramuscular Manual Therapy  Patient Name: Amira Handley : 1955   Treatment/Medical Diagnosis: Right hip pain [M25.551]   Referral Source: Angelo Ramirez, *     Date of Initial Visit: 3/5/20 Attended Visits: 1 Missed Visits: 0     Based on findings from the physical therapy examination and evaluation, the evaluating therapist believes the patient, Amira Handley would benefit from including Dry Needling as part of the plan of care. Dry needling is a treatment technique utilized in conjunction with other PT interventions to inactivate myofascial trigger points and the pain and dysfunction they cause. Dry Needling is an advanced procedure that requires additional training of intensive course work. PROCEDURE:   Solid filament sterile needle (typically 0.3mm/30 gauge) inserted into a trigger point   Repeated movements inactivate the trigger points, taking 30-60 seconds per site   Typically consists of 1 dry needling session per week and a possible second treatment including muscle re-education, flexibility, strengthening and other manual techniques to facilitate the benefits of dry needling     BENEFITS:   Inactivation of trigger points   Decreased pain   Increased muscle length   Improved movement patterns   Restoration of function POTENTIAL RISKS:   Post-needling soreness   Infection   Bruising/bleeding   Penetration of a nerve   Pneumothorax   All treating PTs have been thoroughly educated in avoiding adverse reactions    If you agree with this recommendation, please sign the attached prescription form and fax it to us at [phone]. If you have questions or concerns regarding dry needling or any other treatment we may be providing, please contact us at [phone].   Thank you for allowing us to assist in the care of your patient. Therapist Signature: Emmanuel Steward, PT, OCS, SCS, CSCS Date: 3/5/2020     Time: 1:41 PM   NOTE TO PHYSICIAN:  PLEASE COMPLETE THE ORDERS BELOW AND FAX TO   [facility] Physical Therapy: [phone]  If you are unable to process this request in 24 hours please contact our office: [phone] I have read the above request and AGREE to the recommendation of including dry needling as part of the plan of care. ? I have read the above request and DO NOT AGREE to including dry needling as part of the plan of care.    ? I have read the above report and request that my patient continue therapy with the following changes/special instructions: _________________________________________________     Physician Signature:        Date:       Time:

## 2020-03-20 ENCOUNTER — APPOINTMENT (OUTPATIENT)
Dept: PHYSICAL THERAPY | Age: 65
End: 2020-03-20
Payer: MEDICARE

## 2020-03-24 ENCOUNTER — APPOINTMENT (OUTPATIENT)
Dept: PHYSICAL THERAPY | Age: 65
End: 2020-03-24
Payer: MEDICARE

## 2020-03-27 ENCOUNTER — APPOINTMENT (OUTPATIENT)
Dept: PHYSICAL THERAPY | Age: 65
End: 2020-03-27
Payer: MEDICARE

## 2020-03-31 NOTE — PROGRESS NOTES
[address] [place] [address], [address] [facility] - Phone: [phone]  Fax: [phone] OF CARE/RECERTIFICATION FOR PHYSICAL THERAPY          Patient Name: aKrl Hernandez : 1955   Treatment/Medical Diagnosis: Right hip pain [M25.551]   Onset Date:     Referral Source: Kiko Millina, * Start of Care [facility]): 3/5/20   Prior Hospitalization: See Medical History Provider #: [id number]   Prior Level of Function: Pain free amb   Comorbidities: HTN, OA   Medications: Verified on Patient Summary List   Visits from [facility]: 1 Missed Visits: 0     Goal/Measure of Progress Goal Met? 1. Decrease max pain by 50-75% to assist with ADLs   Status at last Eval: 10/10 Current Status: na n/a   2. Increase FOTO score by 10% to show functional improvment   Status at last Eval: na Current Status: na n/a   3. Will rate >/= +5 on Global Rating of Change and be prepared to DC to HEP. Status at last Eval: na Current Status: na n/a     Key Functional Changes/Progress: Pt temporarily placed on hold due to the nationwide concerns over COVID-19. Pt issued HEP and therapy staff will continue to contact pt every 1-2 weeks via phone to monitor progress.  Plan to resume physical therapy after the resolution of COVID 19 situation.   Problem List: pain affecting function, decrease ROM, decrease strength, impaired gait/ balance, decrease ADL/ functional abilitiies, decrease activity tolerance and decrease flexibility/ joint mobility   Treatment Plan may include any combination of the following: Therapeutic exercise, Therapeutic activities, Neuromuscular re-education, Physical agent/modality, Manual therapy, Patient education, Self Care training and Functional mobility training  Patient Goal(s) has been updated and includes:      Goals for this certification period include and are to be achieved in   4  weeks upon return to PT  Cont with goals above  Frequency / Duration:   Patient to be seen   2   times per week for   4    weeks upon return to PT:    Assessments/Recommendations: hold PT due to [address] situation    If you have any questions/comments please contact us directly at [phone]. Thank you for allowing us to assist in the care of your patient. Therapist Signature: Mikhail Soto, PT Date: 6/60/7898   Certification Period:  Reporting Period: 3/5/20 - 6/2/20  3/5/20 - 4/1/20 Time: 11:54 AM   NOTE TO PHYSICIAN:  PLEASE COMPLETE THE ORDERS BELOW AND FAX TO   [facility] Physical Therapy: [phone]  If you are unable to process this request in 24 hours please contact our office: [phone]    ___ I have read the above report and request that my patient continue as recommended.   ___ I have read the above report and request that my patient continue therapy with the following changes/special instructions: ________________________________________________   ___ I have read the above report and request that my patient be discharged from therapy.      Physician Signature:        Date:       Time:

## 2020-04-01 NOTE — PROGRESS NOTES
PHYSICAL THERAPY - COURTESY CHECK-IN PHONE CALL    Patient Name: Daryn Rubio        Date: 2020  : 1955   yes Patient  Verified  Insurance: Payor: Oswaldo Goff / Plan[place] & [place] / Product Type: Medicare /      Start time: 11:50 End time: 12:06     Detail of Conversation:    Pt reports that she still has pain here and there but not dying and dont want to risk by coming in.   Reviewed HEP and encouraged to maintain HEP    Follow-up Actions:    [x] Check-in via phone in 1-2 weeks  [] Provide updated HEP  [] Discharge no further need for check-ins (write DC note and send to physician)          Therapist: Francois Harris, PT    Date: 2020 Time: 12:09 PM

## 2020-04-03 ENCOUNTER — APPOINTMENT (OUTPATIENT)
Dept: PHYSICAL THERAPY | Age: 65
End: 2020-04-03

## 2020-04-10 ENCOUNTER — APPOINTMENT (OUTPATIENT)
Dept: PHYSICAL THERAPY | Age: 65
End: 2020-04-10

## 2020-08-14 NOTE — PROGRESS NOTES
[address] [place] [address], [address] [facility] - Phone: [phone]  Fax: [phone] FOR PHYSICAL THERAPY          Patient Name: Aakash Baldwin : 1955   Treatment/Medical Diagnosis: Right hip pain [M25.551]   Onset Date:      Referral Source: Hira Christie, * Start of Care [facility]): 3/5/20   Prior Hospitalization: See Medical History Provider #: [id number]   Prior Level of Function: Pain free amb   Comorbidities: HTN, OA   Medications: Verified on Patient Summary List   Visits from [facility]: 1 Missed Visits: 0               Goal/Measure of Progress Goal Met? 1. Decrease max pain by 50-75% to assist with ADLs   Status at last Eval: 10/10 Current Status: na n/a   2. Increase FOTO score by 10% to show functional improvment   Status at last Eval: na Current Status: na n/a   3. Will rate >/= +5 on Global Rating of Change and be prepared to DC to HEP. Status at last Eval: na Current Status: na n/a      Key Functional Changes/Progress: Pt temporarily placed on hold due to the nationwide concerns over COVID-19. However, she did not return to PT treatment after the clinic reopened. Assessments/Recommendations: Discontinue therapy due to lack of attendance or compliance. If you have any questions/comments please contact us directly at [phone]. Thank you for allowing us to assist in the care of your patient.     Therapist Signature: Flaquita Jaramillo, PT, OCS, SCS, CSCS Date: 2020   Reporting Period: 3/5/20 - 20 Time: 11:30 AM

## 2023-03-02 ENCOUNTER — HOSPITAL ENCOUNTER (OUTPATIENT)
Facility: HOSPITAL | Age: 68
Discharge: HOME | End: 2023-03-05

## 2023-03-02 DIAGNOSIS — M16.12 PRIMARY OSTEOARTHRITIS OF LEFT HIP: ICD-10-CM

## 2023-03-04 LAB
BACTERIA SPEC CULT: NORMAL
BACTERIA SPEC CULT: NORMAL
SERVICE CMNT-IMP: NORMAL

## 2023-03-13 PROBLEM — M16.12 OSTEOARTHRITIS OF LEFT HIP: Chronic | Status: ACTIVE | Noted: 2023-03-13

## 2023-03-13 PROBLEM — M17.12 OSTEOARTHRITIS OF LEFT KNEE: Chronic | Status: ACTIVE | Noted: 2023-03-13

## 2023-03-13 NOTE — H&P
History and Physical        Patient: Crys Blakely               Sex: female          DOA: (Not on file)         YOB: 1955      Age:  69 y.o.        LOS:  LOS: 0 days        HPI:     Thor Ding is seen for a new patient consultation at the request of Dr. Beto Moore. Thor Ding is a 75-year-old, right-handed,  female seen today for evaluation and treatment of a left moderate to severe coxarthrosis/L3-4 and L4-5 anterolisthesis. 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 She was going to [place], but is now coming here. She is good friends with Wilian Harris have seen over the years. She sees [address]  for her spine. They have been talking about fusion, but she has not had enough pain to warrant it. Her left hip is giving her worsening pain now in the left groin that has gotten steadily worse over the years. She is now here today to progress to left direct anterior JOSE. AP, lateral, bilateral oblique, flexion and extension views of the lumbar spine were obtained and interpreted in the office and reveal an L3-4 and L4-5 anterolisthesis; otherwise, normal alignment, no periosteal reaction, no medullary lesions, no osteopenia, well-aligned disc spaces, no chondrolysis, and no fractures. An AP view of the pelvis was obtained and interpreted in the office and reveals left moderate to severe coxarthrosis with only mild right coxarthrosis; otherwise, no periosteal reaction, no medullary lesions, no osteopenia, well-aligned joint spaces, no chondrolysis, and no fractures.       Past Medical History:   Diagnosis Date    Anxiety     Arthritis     cervical OA  with radiculapthy    Central spinal stenosis     Dysthymia     Hypercholesteremia     Hyperlipidemia     Hypertension     Hypothyroid     Lumbar spondylolysis     Lupus (HCC)     PONV (postoperative nausea and vomiting)     severe nausea even after zofran phenergan and scopolamine    SLE (systemic lupus erythematosus) ([facility] [address] 2012    Unspecified adverse effect of anesthesia     hypotension       Past Surgical History:   Procedure Laterality Date    BUNIONECTOMY Bilateral     bilateral    CATARACT EXTRACTION Left     COLONOSCOPY      FACIAL COSMETIC SURGERY  03/01/2023    FINGER TRIGGER RELEASE      HYSTERECTOMY (CERVIX STATUS UNKNOWN)      LYMPH NODE BIOPSY      SKIN BIOPSY         Family History   Problem Relation Age of Onset    Stroke Father     Cancer Mother         lung    Heart Disease Other         artheroscleortic CVD on both side of family       Social History     Socioeconomic History    Marital status: Single   Tobacco Use    Smoking status: Never   Substance and Sexual Activity    Alcohol use: Yes     Alcohol/week: 2.5 standard drinks     Comment: liquor twice a month    Drug use: No       Prior to Admission medications    Medication Sig Start Date End Date Taking? Authorizing Provider   levothyroxine (SYNTHROID) 75 MCG tablet Take 75 mcg by mouth Daily    Historical Provider, MD   losartan (COZAAR) 100 MG tablet Take 100 mg by mouth daily    Historical Provider, MD   amLODIPine (NORVASC) 10 MG tablet Take 10 mg by mouth nightly    Historical Provider, MD   calcitRIOL (ROCALTROL) 0.25 MCG capsule Take 0.25 mcg by mouth daily    Historical Provider, MD   hydroCHLOROthiazide (HYDRODIURIL) 25 MG tablet Take 25 mg by mouth as needed    Historical Provider, MD   Potassium Chloride (KCL-20 PO) Take 10 Units by mouth    Historical Provider, MD   sertraline (ZOLOFT) 100 MG tablet Take 100 mg by mouth daily    Historical Provider, MD   ALPRAZolam (XANAX) 1 MG tablet Take 1 mg by mouth nightly as needed for Sleep.     Historical Provider, MD   hydroxychloroquine (PLAQUENIL) 200 MG tablet Take 200 mg by mouth daily    Ar Automatic Reconciliation   rosuvastatin (CRESTOR) 10 MG tablet Take 10 mg by mouth nightly    Ar Automatic Reconciliation       Allergies   Allergen Reactions Azithromycin Rash    Lidocaine Rash     Ok with marcaine    Penicillins Rash       Review of Systems  GENERAL:  Patient has no signs of fever, chills or weight change. or fatigue  HEAD/ENTM:  Patient has no signs of headaches, dizziness, hearing loss, sore throat, recent cold, double vision, blurred vision, itchy eyes, eye redness or eye discharge. or ringing in ears  NEUROLOGIC:  Patient has no signs of muscle weakness, numbness/tingling, loss of balance or seizure disorder. or fainting  CARDIOVASCULAR:  Patient has no signs of chest pain, palpitations, rheumatic fever or heart murmur. RESPIRATORY:  Patient has no signs of chronic cough, wheezing, pain on breathing or shortness of breath. or difficulty breathing  GASTROINTESTINAL:  Patient has no signs of nausea/vomiting, difficulty swallowing, gas/bloating, indigestion, abdominal pain, diarrhea, bloody stools or hemorrhoids. GENITOURINARY:  Patient has no signs of blood in the urine, burning sensation, frequent urinating or incontinence. , painful urinating or bladder/kidney infection  MUSCULOSKELETAL: Patient presents with joint pain. Patient has no signs of fracture/dislocation, sprain/strain, tendonitis, joint stiffness, rheumatoid disease, gout or swelling in feet. INTEGUMENTARY:  Patient has no signs of rash/itching, psoriasis, Raynaud's phenomenon or varicose veins. EMOTIONAL:  Patient has no signs of bipolar disorder or change in mood. , anxiety, depression or memory loss      Physical Exam:      There were no vitals taken for this visit. Physical Exam:   Physical exam shows a healthy-appearing, 58-year-old, white female. The left his about a 100 degree arc of rotational motion with pain referred back to the left groin. Otherwise, examination of the left hip shows the patient is nontender to palpation. The skin is normal with no contusions or wounds. There is no pain at either hip or the greater trochanters.    As the iliotibial band is stretched, there is no pain. The patient has normal light touch sensation in all dermatomal patterns. There is normal motor strength to heel and toe walking. There is negative straight leg raising bilaterally to 90 degrees in the seated position. The patient has good capillary refill. Assessment/Plan     Principal Problem:    Osteoarthritis of left hip  Resolved Problems:    * No resolved hospital problems. *    The risks associated with left outpatient direct anterior hip arthroplasty were reviewed and questions answered. The risks including pain, bleeding, infection, fracture, deep vein thrombosis, pulmonary embolism, need for future surgery  amongst others were reviewed and questions were answered. The patient is going to be scheduled for an outpatient direct anterior JOSE using the [employer] system. The patient will use aspirin 81mg twice daily for DVT/VTE prophylaxis. The patient was issued a prescription for Roxicodone for postoperative pain management and a Keflex prescription for antibiotic prophylaxis. The patient will receive an IV dose of antibiotics preoperatively as well. We will discharge to home health the same day. Review of X-Rays show Kellgren-Darion grade 3/4 changes. The patient will follow up two weeks postoperatively. At the time of the surgery we will equalize leg lengths by X-ray. The patient was counseled on the importance of ice and elevation. We are going to do this at [facility].  She is redoing a bathroom right now at her place in [facility] and needs at least two months to do it so we are going to set this up about four months away so that we can have plenty of time to have her bathroom finished before we do the surgery. She was issued a walker in the office today.

## 2023-03-14 ENCOUNTER — ANESTHESIA EVENT (OUTPATIENT)
Facility: HOSPITAL | Age: 68
End: 2023-03-14
Payer: MEDICARE

## 2023-03-15 ENCOUNTER — HOSPITAL ENCOUNTER (OUTPATIENT)
Facility: HOSPITAL | Age: 68
Discharge: HOME | End: 2023-03-15
Attending: ORTHOPAEDIC SURGERY | Admitting: ORTHOPAEDIC SURGERY
Payer: MEDICARE

## 2023-03-15 ENCOUNTER — ANESTHESIA (OUTPATIENT)
Facility: HOSPITAL | Age: 68
End: 2023-03-15
Payer: MEDICARE

## 2023-03-15 ENCOUNTER — APPOINTMENT (OUTPATIENT)
Facility: HOSPITAL | Age: 68
End: 2023-03-15
Attending: ORTHOPAEDIC SURGERY
Payer: MEDICARE

## 2023-03-15 VITALS
BODY MASS INDEX: 27.42 KG/M2 | HEIGHT: 67 IN | HEART RATE: 79 BPM | SYSTOLIC BLOOD PRESSURE: 106 MMHG | WEIGHT: 174.7 LBS | TEMPERATURE: 98.7 F | DIASTOLIC BLOOD PRESSURE: 62 MMHG | RESPIRATION RATE: 18 BRPM | OXYGEN SATURATION: 100 %

## 2023-03-15 PROBLEM — M16.12 OSTEOARTHRITIS OF LEFT HIP, UNSPECIFIED OSTEOARTHRITIS TYPE: Status: ACTIVE | Noted: 2023-03-15

## 2023-03-15 PROBLEM — M16.12 OSTEOARTHRITIS OF LEFT HIP, UNSPECIFIED OSTEOARTHRITIS TYPE: Status: RESOLVED | Noted: 2023-03-15 | Resolved: 2023-03-15

## 2023-03-15 PROBLEM — M16.12 OSTEOARTHRITIS OF LEFT HIP: Chronic | Status: RESOLVED | Noted: 2023-03-13 | Resolved: 2023-03-15

## 2023-03-15 LAB
ABO + RH BLD: NORMAL
BLOOD GROUP ANTIBODIES SERPL: NORMAL
SPECIMEN EXP DATE BLD: NORMAL

## 2023-03-15 PROCEDURE — 6360000002 HC RX W HCPCS: Performed by: PHYSICIAN ASSISTANT

## 2023-03-15 PROCEDURE — 97165 OT EVAL LOW COMPLEX 30 MIN: CPT

## 2023-03-15 PROCEDURE — 3600000005 HC SURGERY LEVEL 5 BASE: Performed by: ORTHOPAEDIC SURGERY

## 2023-03-15 PROCEDURE — 2709999900 HC NON-CHARGEABLE SUPPLY: Performed by: ORTHOPAEDIC SURGERY

## 2023-03-15 PROCEDURE — 86850 RBC ANTIBODY SCREEN: CPT

## 2023-03-15 PROCEDURE — 6370000000 HC RX 637 (ALT 250 FOR IP): Performed by: ORTHOPAEDIC SURGERY

## 2023-03-15 PROCEDURE — 97535 SELF CARE MNGMENT TRAINING: CPT

## 2023-03-15 PROCEDURE — 3700000001 HC ADD 15 MINUTES (ANESTHESIA): Performed by: ORTHOPAEDIC SURGERY

## 2023-03-15 PROCEDURE — 7100000000 HC PACU RECOVERY - FIRST 15 MIN: Performed by: ORTHOPAEDIC SURGERY

## 2023-03-15 PROCEDURE — 3600000015 HC SURGERY LEVEL 5 ADDTL 15MIN: Performed by: ORTHOPAEDIC SURGERY

## 2023-03-15 PROCEDURE — 7100000001 HC PACU RECOVERY - ADDTL 15 MIN: Performed by: ORTHOPAEDIC SURGERY

## 2023-03-15 PROCEDURE — 3700000000 HC ANESTHESIA ATTENDED CARE: Performed by: ORTHOPAEDIC SURGERY

## 2023-03-15 PROCEDURE — 7100000011 HC PHASE II RECOVERY - ADDTL 15 MIN: Performed by: ORTHOPAEDIC SURGERY

## 2023-03-15 PROCEDURE — 2580000003 HC RX 258: Performed by: ORTHOPAEDIC SURGERY

## 2023-03-15 PROCEDURE — 36415 COLL VENOUS BLD VENIPUNCTURE: CPT

## 2023-03-15 PROCEDURE — 2500000003 HC RX 250 WO HCPCS: Performed by: ORTHOPAEDIC SURGERY

## 2023-03-15 PROCEDURE — 97161 PT EVAL LOW COMPLEX 20 MIN: CPT

## 2023-03-15 PROCEDURE — 2580000003 HC RX 258: Performed by: PHYSICIAN ASSISTANT

## 2023-03-15 PROCEDURE — 6360000002 HC RX W HCPCS: Performed by: ORTHOPAEDIC SURGERY

## 2023-03-15 PROCEDURE — A4217 STERILE WATER/SALINE, 500 ML: HCPCS | Performed by: ORTHOPAEDIC SURGERY

## 2023-03-15 PROCEDURE — C1776 JOINT DEVICE (IMPLANTABLE): HCPCS | Performed by: ORTHOPAEDIC SURGERY

## 2023-03-15 PROCEDURE — 7100000010 HC PHASE II RECOVERY - FIRST 15 MIN: Performed by: ORTHOPAEDIC SURGERY

## 2023-03-15 PROCEDURE — 2500000003 HC RX 250 WO HCPCS: Performed by: NURSE ANESTHETIST, CERTIFIED REGISTERED

## 2023-03-15 PROCEDURE — 6370000000 HC RX 637 (ALT 250 FOR IP): Performed by: PHYSICIAN ASSISTANT

## 2023-03-15 PROCEDURE — 97116 GAIT TRAINING THERAPY: CPT

## 2023-03-15 PROCEDURE — 6360000002 HC RX W HCPCS: Performed by: SPECIALIST

## 2023-03-15 PROCEDURE — 6370000000 HC RX 637 (ALT 250 FOR IP): Performed by: SPECIALIST

## 2023-03-15 PROCEDURE — 6360000002 HC RX W HCPCS: Performed by: NURSE ANESTHETIST, CERTIFIED REGISTERED

## 2023-03-15 PROCEDURE — C1713 ANCHOR/SCREW BN/BN,TIS/BN: HCPCS | Performed by: ORTHOPAEDIC SURGERY

## 2023-03-15 DEVICE — CUP ACET DIA50MM HIP GRIPTION PRI CEMENTLESS FIX SECT SER: Type: IMPLANTABLE DEVICE | Site: HIP | Status: FUNCTIONAL

## 2023-03-15 DEVICE — LINER ACET OD50MM ID32MM NEUT OFFSET HIP ALTRX PINN: Type: IMPLANTABLE DEVICE | Site: HIP | Status: FUNCTIONAL

## 2023-03-15 DEVICE — HIP H2 TOT ADV OTHER HD IMPL CAPPED SYNTHES: Type: IMPLANTABLE DEVICE | Site: HIP | Status: FUNCTIONAL

## 2023-03-15 DEVICE — STEM FEM SZ 6 L107MM 12/14 TAPR HI OFFSET HIP DUOFIX CLLRD: Type: IMPLANTABLE DEVICE | Site: HIP | Status: FUNCTIONAL

## 2023-03-15 DEVICE — HEAD FEM DIA32MM +5MM OFFSET 12/14 TAPR HIP CERAMIC BIOLOX: Type: IMPLANTABLE DEVICE | Site: HIP | Status: FUNCTIONAL

## 2023-03-15 RX ORDER — SODIUM CHLORIDE 0.9 % (FLUSH) 0.9 %
5-40 SYRINGE (ML) INJECTION EVERY 12 HOURS SCHEDULED
Status: DISCONTINUED | OUTPATIENT
Start: 2023-03-15 | End: 2023-03-15 | Stop reason: HOSPADM

## 2023-03-15 RX ORDER — MIDAZOLAM HYDROCHLORIDE 1 MG/ML
INJECTION INTRAMUSCULAR; INTRAVENOUS PRN
Status: DISCONTINUED | OUTPATIENT
Start: 2023-03-15 | End: 2023-03-15 | Stop reason: SDUPTHER

## 2023-03-15 RX ORDER — LABETALOL HYDROCHLORIDE 5 MG/ML
10 INJECTION, SOLUTION INTRAVENOUS
Status: DISCONTINUED | OUTPATIENT
Start: 2023-03-15 | End: 2023-03-15 | Stop reason: HOSPADM

## 2023-03-15 RX ORDER — ONDANSETRON 2 MG/ML
4 INJECTION INTRAMUSCULAR; INTRAVENOUS
Status: DISCONTINUED | OUTPATIENT
Start: 2023-03-15 | End: 2023-03-15 | Stop reason: HOSPADM

## 2023-03-15 RX ORDER — SODIUM CHLORIDE, SODIUM LACTATE, POTASSIUM CHLORIDE, CALCIUM CHLORIDE 600; 310; 30; 20 MG/100ML; MG/100ML; MG/100ML; MG/100ML
1000 INJECTION, SOLUTION INTRAVENOUS CONTINUOUS
Status: DISCONTINUED | OUTPATIENT
Start: 2023-03-15 | End: 2023-03-15 | Stop reason: HOSPADM

## 2023-03-15 RX ORDER — SODIUM CHLORIDE 9 MG/ML
INJECTION, SOLUTION INTRAVENOUS PRN
Status: DISCONTINUED | OUTPATIENT
Start: 2023-03-15 | End: 2023-03-15 | Stop reason: HOSPADM

## 2023-03-15 RX ORDER — KETOROLAC TROMETHAMINE 15 MG/ML
INJECTION, SOLUTION INTRAMUSCULAR; INTRAVENOUS PRN
Status: DISCONTINUED | OUTPATIENT
Start: 2023-03-15 | End: 2023-03-15 | Stop reason: ALTCHOICE

## 2023-03-15 RX ORDER — ACETAMINOPHEN 500 MG
1000 TABLET ORAL ONCE
Status: DISCONTINUED | OUTPATIENT
Start: 2023-03-15 | End: 2023-03-15

## 2023-03-15 RX ORDER — TRANEXAMIC ACID 650 MG/1
1950 TABLET ORAL
Status: DISCONTINUED | OUTPATIENT
Start: 2023-03-15 | End: 2023-03-15 | Stop reason: HOSPADM

## 2023-03-15 RX ORDER — DEXAMETHASONE SODIUM PHOSPHATE 10 MG/ML
8 INJECTION, SOLUTION INTRAMUSCULAR; INTRAVENOUS ONCE
Status: COMPLETED | OUTPATIENT
Start: 2023-03-15 | End: 2023-03-15

## 2023-03-15 RX ORDER — OXYCODONE HYDROCHLORIDE 5 MG/1
5 TABLET ORAL
Status: DISCONTINUED | OUTPATIENT
Start: 2023-03-15 | End: 2023-03-15 | Stop reason: HOSPADM

## 2023-03-15 RX ORDER — SODIUM CHLORIDE, SODIUM LACTATE, POTASSIUM CHLORIDE, CALCIUM CHLORIDE 600; 310; 30; 20 MG/100ML; MG/100ML; MG/100ML; MG/100ML
INJECTION, SOLUTION INTRAVENOUS CONTINUOUS
Status: DISCONTINUED | OUTPATIENT
Start: 2023-03-15 | End: 2023-03-15 | Stop reason: HOSPADM

## 2023-03-15 RX ORDER — CEPHALEXIN 500 MG/1
500 CAPSULE ORAL 4 TIMES DAILY
Qty: 4 CAPSULE | Refills: 0
Start: 2023-03-15

## 2023-03-15 RX ORDER — CELECOXIB 100 MG/1
200 CAPSULE ORAL ONCE
Status: COMPLETED | OUTPATIENT
Start: 2023-03-15 | End: 2023-03-15

## 2023-03-15 RX ORDER — MAGNESIUM HYDROXIDE 1200 MG/15ML
LIQUID ORAL CONTINUOUS PRN
Status: COMPLETED | OUTPATIENT
Start: 2023-03-15 | End: 2023-03-15

## 2023-03-15 RX ORDER — PANTOPRAZOLE SODIUM 40 MG/1
40 TABLET, DELAYED RELEASE ORAL ONCE
Status: COMPLETED | OUTPATIENT
Start: 2023-03-15 | End: 2023-03-15

## 2023-03-15 RX ORDER — PROPOFOL 10 MG/ML
INJECTION, EMULSION INTRAVENOUS PRN
Status: DISCONTINUED | OUTPATIENT
Start: 2023-03-15 | End: 2023-03-15 | Stop reason: SDUPTHER

## 2023-03-15 RX ORDER — DIPHENHYDRAMINE HYDROCHLORIDE 50 MG/ML
12.5 INJECTION INTRAMUSCULAR; INTRAVENOUS
Status: DISCONTINUED | OUTPATIENT
Start: 2023-03-15 | End: 2023-03-15 | Stop reason: HOSPADM

## 2023-03-15 RX ORDER — ONDANSETRON 2 MG/ML
INJECTION INTRAMUSCULAR; INTRAVENOUS PRN
Status: DISCONTINUED | OUTPATIENT
Start: 2023-03-15 | End: 2023-03-15 | Stop reason: SDUPTHER

## 2023-03-15 RX ORDER — BUPIVACAINE HYDROCHLORIDE AND EPINEPHRINE 2.5; 5 MG/ML; UG/ML
INJECTION, SOLUTION EPIDURAL; INFILTRATION; INTRACAUDAL; PERINEURAL PRN
Status: DISCONTINUED | OUTPATIENT
Start: 2023-03-15 | End: 2023-03-15 | Stop reason: ALTCHOICE

## 2023-03-15 RX ORDER — DROPERIDOL 2.5 MG/ML
0.62 INJECTION, SOLUTION INTRAMUSCULAR; INTRAVENOUS
Status: DISCONTINUED | OUTPATIENT
Start: 2023-03-15 | End: 2023-03-15 | Stop reason: HOSPADM

## 2023-03-15 RX ORDER — SODIUM CHLORIDE 0.9 % (FLUSH) 0.9 %
5-40 SYRINGE (ML) INJECTION PRN
Status: DISCONTINUED | OUTPATIENT
Start: 2023-03-15 | End: 2023-03-15 | Stop reason: HOSPADM

## 2023-03-15 RX ORDER — EPHEDRINE SULFATE/0.9% NACL/PF 50 MG/5 ML
SYRINGE (ML) INTRAVENOUS PRN
Status: DISCONTINUED | OUTPATIENT
Start: 2023-03-15 | End: 2023-03-15 | Stop reason: SDUPTHER

## 2023-03-15 RX ORDER — HYDROMORPHONE HYDROCHLORIDE 1 MG/ML
0.25 INJECTION, SOLUTION INTRAMUSCULAR; INTRAVENOUS; SUBCUTANEOUS EVERY 5 MIN PRN
Status: DISCONTINUED | OUTPATIENT
Start: 2023-03-15 | End: 2023-03-15 | Stop reason: HOSPADM

## 2023-03-15 RX ORDER — IPRATROPIUM BROMIDE AND ALBUTEROL SULFATE 2.5; .5 MG/3ML; MG/3ML
1 SOLUTION RESPIRATORY (INHALATION)
Status: DISCONTINUED | OUTPATIENT
Start: 2023-03-15 | End: 2023-03-15 | Stop reason: HOSPADM

## 2023-03-15 RX ORDER — ACETAMINOPHEN 500 MG
1000 TABLET ORAL ONCE
Status: COMPLETED | OUTPATIENT
Start: 2023-03-15 | End: 2023-03-15

## 2023-03-15 RX ORDER — FENTANYL CITRATE 50 UG/ML
25 INJECTION, SOLUTION INTRAMUSCULAR; INTRAVENOUS EVERY 5 MIN PRN
Status: DISCONTINUED | OUTPATIENT
Start: 2023-03-15 | End: 2023-03-15 | Stop reason: HOSPADM

## 2023-03-15 RX ADMIN — MIDAZOLAM 1 MG: 1 INJECTION INTRAMUSCULAR; INTRAVENOUS at 08:27

## 2023-03-15 RX ADMIN — PANTOPRAZOLE SODIUM 40 MG: 40 TABLET, DELAYED RELEASE ORAL at 06:21

## 2023-03-15 RX ADMIN — MEPIVACAINE HYDROCHLORIDE 45 MG: 15 INJECTION, SOLUTION EPIDURAL; INFILTRATION at 08:32

## 2023-03-15 RX ADMIN — PROPOFOL 50 MG: 10 INJECTION, EMULSION INTRAVENOUS at 08:35

## 2023-03-15 RX ADMIN — ACETAMINOPHEN 1000 MG: 500 TABLET ORAL at 06:21

## 2023-03-15 RX ADMIN — Medication 2000 MG: at 08:40

## 2023-03-15 RX ADMIN — MIDAZOLAM 1 MG: 1 INJECTION INTRAMUSCULAR; INTRAVENOUS at 08:25

## 2023-03-15 RX ADMIN — MIDAZOLAM 2 MG: 1 INJECTION INTRAMUSCULAR; INTRAVENOUS at 08:20

## 2023-03-15 RX ADMIN — SODIUM CHLORIDE, SODIUM LACTATE, POTASSIUM CHLORIDE, AND CALCIUM CHLORIDE: 600; 310; 30; 20 INJECTION, SOLUTION INTRAVENOUS at 09:05

## 2023-03-15 RX ADMIN — PROPOFOL 50 MCG/KG/MIN: 10 INJECTION, EMULSION INTRAVENOUS at 08:40

## 2023-03-15 RX ADMIN — Medication 10 MG: at 09:13

## 2023-03-15 RX ADMIN — SODIUM CHLORIDE, SODIUM LACTATE, POTASSIUM CHLORIDE, AND CALCIUM CHLORIDE: 600; 310; 30; 20 INJECTION, SOLUTION INTRAVENOUS at 06:52

## 2023-03-15 RX ADMIN — Medication 10 MG: at 09:18

## 2023-03-15 RX ADMIN — DEXAMETHASONE SODIUM PHOSPHATE 8 MG: 10 INJECTION, SOLUTION INTRAMUSCULAR; INTRAVENOUS at 06:21

## 2023-03-15 RX ADMIN — Medication 10 MG: at 09:20

## 2023-03-15 RX ADMIN — PROPOFOL 30 MG: 10 INJECTION, EMULSION INTRAVENOUS at 08:38

## 2023-03-15 RX ADMIN — TRANEXAMIC ACID 1950 MG: 650 TABLET ORAL at 06:36

## 2023-03-15 RX ADMIN — CELECOXIB 200 MG: 100 CAPSULE ORAL at 06:21

## 2023-03-15 RX ADMIN — SODIUM CHLORIDE, POTASSIUM CHLORIDE, SODIUM LACTATE AND CALCIUM CHLORIDE 1000 ML: 600; 310; 30; 20 INJECTION, SOLUTION INTRAVENOUS at 06:19

## 2023-03-15 RX ADMIN — Medication 10 MG: at 09:11

## 2023-03-15 RX ADMIN — ONDANSETRON HYDROCHLORIDE 4 MG: 2 INJECTION INTRAMUSCULAR; INTRAVENOUS at 09:49

## 2023-03-15 ASSESSMENT — PAIN DESCRIPTION - ORIENTATION: ORIENTATION: LEFT

## 2023-03-15 ASSESSMENT — PAIN DESCRIPTION - DESCRIPTORS: DESCRIPTORS: ACHING

## 2023-03-15 ASSESSMENT — PAIN DESCRIPTION - LOCATION: LOCATION: HIP

## 2023-03-15 ASSESSMENT — PAIN SCALES - GENERAL: PAINLEVEL_OUTOF10: 3

## 2023-03-15 ASSESSMENT — PAIN - FUNCTIONAL ASSESSMENT: PAIN_FUNCTIONAL_ASSESSMENT: 0-10

## 2023-03-15 NOTE — DISCHARGE INSTRUCTIONS
Toño Mendosa III, MD Inocente Boon, PA-C    Lower Extremity Surgery  Discharge Instructions      Please take the time to review the following instructions before you leave the hospital and use them as guidelines during your recovery from surgery. If you have any questions you may contact my office at [phone]. In the event of an emergency please call 911 or have someone take you to the nearest emergency room. Wound Care/Dressing Changes:    [x]   You may change your dressing as needed. Beginning the 2 days after you are discharged from the hospital you can change your dressing daily. A big, bulky dressing isn't necessary as long as there isn't any drainage from the incisions. There will be a piece of mesh tape over your incision that resembles gauze. This tape should stay on and you should not attempt to remove it. Showering/Bathing:    [x]     You may shower 2 days after surgery. Your dressing may be removed for showering. Do not soak your incision underwater. Weight Bearing Status/Braces/Activity:    You are weight bearing as tolerated on the operative extremity. Ice/Elevation:    Continue ice and elevation consistently for 48 hours after surgery. Medication:    You will be given a prescription for pain medications. This should have been given at your preop appointment. Refills of pain medication are authorized during office hours only (8AM - 5PM Mon thru Fri). You can take 1000 mg of Tylenol every 8 hours. Do not exceed 3000 mg of Tylenol per day. If you have been given Norco for post operative pain, do not take the Tylenol. You should use Aspirin 81 mg twice daily for 21 days from the date of your surgery. This will help to prevent blood clots from forming in your legs. This should be started at dinner the day of your surgery. If you are taking another medication such as Xarelto this should also be started the day after the surgery.   You may resume the medications you were taking prior to your surgery, unless otherwise specified in your discharge instructions. You are to take an oral antibiotic when you get home from the surgery until the prescription is done. This should have been provided at the pre-operative appointment. This antibiotic is generally Keflex or Clindamycin. Follow Up Appointment:    If you are unsure of your follow-up appointment date and time, please call [phone]. Please let our  know you are scheduling a post-op appointment. Most appointments should be between 7-14 days after your surgery. Physical Therapy:    [x]   You are to participate with Home Health Physical Therapy as arranged pre-operatively in the convenience of your own home. Important signs and symptoms:    If any of the following signs and symptoms occurs, you should contact Dr. Ester Man' office. Please be advised if a problem arises which you feel required immediate medical attention or your are unable to contact Dr. Ester Man' office you should seek immediate medical attention. Signs and symptoms to watch for include:  A sudden increase in swelling and/or redness or warmth at the area your surgery was performed which isn't relieved by rest, ice and elevation. Oral temperature greater than 101.5 degrees for 12 hours or more which isn't relieved by an increase in fluid intake and taking two Tylenol every 4-6 hours. Do not exceed 3000 mg of Tylenol per day. Excessive drainage from your incisions or drainage that hasn't stopped by 72 hours. Calf pain, tenderness, redness or swelling which isn't relieved with rest and elevation. Fever, chills, shortness of breath, chest pain, nausea, vomiting or other signs and symptoms which are of concern to you.    DISCHARGE SUMMARY from Nurse    PATIENT INSTRUCTIONS:    After general anesthesia or intravenous sedation, for 24 hours or while taking prescription Narcotics:  Limit your activities  Do not drive and operate hazardous machinery  Do not make important personal or business decisions  Do  not drink alcoholic beverages  If you have not urinated within 8 hours after discharge, please contact your surgeon on call. Report the following to your surgeon:  Excessive pain, swelling, redness or odor of or around the surgical area  Temperature over 100.5  Nausea and vomiting lasting longer than 4 hours or if unable to take medications  Any signs of decreased circulation or nerve impairment to extremity: change in color, persistent  numbness, tingling, coldness or increase pain  Any questions    What to do at Home:  Recommended activity: activity as tolerated, and no driving until cleared by Dr. Erlinda Etienne. *  Please give a list of your current medications to your Primary Care Provider. *  Please update this list whenever your medications are discontinued, doses are      changed, or new medications (including over-the-counter products) are added. *  Please carry medication information at all times in case of emergency situations. These are general instructions for a healthy lifestyle:    No smoking/ No tobacco products/ Avoid exposure to second hand smoke  Surgeon General's Warning:  Quitting smoking now greatly reduces serious risk to your health. Obesity, smoking, and sedentary lifestyle greatly increases your risk for illness    A healthy diet, regular physical exercise & weight monitoring are important for maintaining a healthy lifestyle    You may be retaining fluid if you have a history of heart failure or if you experience any of the following symptoms:  Weight gain of 3 pounds or more overnight or 5 pounds in a week, increased swelling in our hands or feet or shortness of breath while lying flat in bed. Please call your doctor as soon as you notice any of these symptoms; do not wait until your next office visit.         The discharge information has been reviewed with the patient and caregiver. The patient and caregiver verbalized understanding. Discharge medications reviewed with the patient and caregiver and appropriate educational materials and side effects teaching were provided.   Patient armband removed and shredded   ___________________________________________________________________________________________________________________________________

## 2023-03-15 NOTE — PERIOP NOTE
Chg and nozin completed in preop. Patient placed on Priyanka Paws for a minimum of 30 min in  Preop.

## 2023-03-15 NOTE — OP NOTE
LEFT DIRECT ANTERIOR TOTAL HIP REPLACEMENT    Patient: Consuello Najjar MRN: [id number]  CSN: [id number]   YOB: 1955  Age: 69 y.o. Sex: female      Date of Surgery:3/15/2023     PREOPERATIVE DIAGNOSES:LEFT HIP OSTEOARTHRITIS      POSTOPERATIVE DIAGNOSES:* No post-op diagnosis entered *    PROCEDURE: Left press fit direct anterior total hip arthroplasty. IMPLANTS:   Implant Name Type Inv. Item Serial No.  Lot No. LRB No. Used Action   CUP ACET [id number] HIP GRIPTION OSMIN CEMENTLESS FIX SECT SER - [id number]  CUP ACET [id number] HIP GRIPTION OSMIN CEMENTLESS FIX SECT SER  [facility] [employer] [id number] Left 1 Implanted   LINER ACET OD50MM ID32MM NEUT OFFSET HIP ALTRX PINN - [id number]  LINER ACET OD50MM ID32MM NEUT OFFSET HIP ALTRX PINN  [facility] [employer] [id number] Left 1 Implanted   STEM FEM SZ 6 L107MM 12/14 TAPR HI OFFSET HIP DUOFIX CLLRD - [id number]  STEM FEM SZ 6 L107MM 12/14 TAPR HI OFFSET HIP DUOFIX CLLRD  [facility] [employer] [id number] Left 1 Implanted   HEAD FEM [id number] +5MM OFFSET 12/14 TAPR HIP CERAMIC BIOLOX - [id number]  HEAD FEM [id number] +5MM OFFSET 12/14 TAPR HIP CERAMIC BIOLOX  [facility] [employer]- [id number] Left 1 Implanted       SURGEON: JAMIE Mcgee MD    FIRST ASSISTANT: Lou Pelayo PA-C    SECOND ASSISTANT: Surgical Assistant: Tonia Ribera  Scrub Person First: Emilia Osler  Scrub Person Second: Lai Patterson  Physician Assistant: Lou Pelayo PA-C    ANESTHESIA: Spinal    SPECIMENS TO PATHOLOGY: * No specimens in log *    ESTIMATED BLOOD LOSS: 75cc    FINDINGS: Same    COMPLICATIONS: None    INDICATIONS:  A 69 y.o. White (non-) female with severe coxarthrosis documented by clinical exam and x-ray.   The patient has bone-on-bone arthritis by x-rays and has failed all conservative interventions including medications, weight loss, physical therapy, relative rest, ambulatory aids and injections. The patient now presents for a left total hip arthroplasty due to constant pain with activities of daily living and diminished safety due to patients pain. OPERATION:  The patient was brought into the operating theater, and after adequate appropriate anesthesia the patient was placed on the [place] table. The 1.95gm of oral tranexamic acid was already administered 2 hours ahead of surgery. The surgical hip was prepped and draped for typical computer navigated direct anterior hip surgery. The analgesic cocktail used for the case was 50cc of .25% Marcaine with epinephrine mixed with 30 mg( 1cc ) of Toradol and 30cc of NS ( total of 81 cc). This was injected in the skin as well as the various muscle muscle groups encountered during the procedure using all 81cc's. A 9 cm incision was then made, 3 cm lateral to the anterior superior iliac spine, and 1 cm distal. The incision was deepened down to the fascia of the tensor fascia jessica muscle, where the fascia was incised the length of the wound. A Cobra was placed just lateral to the anterior inferior iliac spine and just lateral to the capsule of the hip. The tensor fascia muscle was then retracted laterally with the Mary, and the rectus femoris was retracted medially with another Mary. The ascending branches of the lateral femoral circumflex vessels were then clamped and electrocauterized. Using a Pepe elevator, the soft tissue was elevated off the anterior part of the femoral capsule, and a Cobra retractor was placed medially under the capsule around the femur. An anterolateral capsulotomy was then performed, from the acetabulum to the intertrochanteric line. The lateral capsule was excised, and the anterior capsule was elevated off the medial neck. The  Quadrate tubercle at the upper end of the intertrochanteric line was identified. The leg was then slightly externally rotated with traction and cut 1 fingerbreadth above the lesser trochanter.  The corkscrew was inserted into the femoral head and it was removed easily rotating the head 180 degrees. A Cobra retractor was placed behind the acetabulum. A skinny Hohmann retractor was placed on the anterior part of the acetabulum rim, and then the labrum and any osteophytes around the acetabulum were resected. The acetabulum was registered using the pointer tracker. The pulvinar in the fovea was resected, and then the acetabulum was reamed in 45 degrees of abduction and the patient's natural anteversion. The reamer was medialized to the base of the fovea and then widened to the the actual cup size to be implanted. There was good peripheral fit with good bleeding bone. The appropriately sized acetabular cup was selected to be implanted. The acetabulum was Simpulse lavage irrigated and then dried with a sponge stick. The cup was then seated fully with excellent purchase and good stability at approximately 40 degrees of abduction and 20 degrees of anteversion. The anterior lip of the cup was just inside the natural acetabulum. No hole eliminator was placed, and the appropriately sized acetabular liner was then Brown-tapered into position. No screws were used in the acetabular cup(6.5 cancellous screws if used). Attention was now turned to the femur. The femoral hook was placed behind the femur. Traction was taken off the leg, and the hip was maximally externally rotated, adducted and extended. The hip was then brought up into the wound as much as possible. A Maria retractor was placed on the medial neck, and a large Hohmann was placed around the greater trochanter. The capsule was then released from the inside of the greater trochanter, from anterior to posterior ( Obturator Internus & Piriformis were released as necessary). The patient had excellent mobility of the femur. The bone closest to the greater trochanter, at the femoral neck, was then removed with a rongeur.  Using the ME-1000, A box osteotome was then used to open the canal, then the lateralizer, the starter broach and finally by the size zero broach. This was then progressively broached up to the appropriate size  following the anteversion of the posterior neck of the femur. Once the broach was seated completely the calcar was planed to make the femoral neck cut smooth and even. There was excellent stability on torquing the femoral broach in the femoral canal. The patient was trialed with a appropriately sized femoral head with different neck lengths. The femur was reduced in the acetabulum and the hip was checked for stability clinically( bone hook around the neck and the force directed anteriorly in max ER-see below) and the XRay was used for leg lengths. The appropriately sized femoral head and appropriate neck offset gave excellent anterior stability. This length and offset were determined with the XRay. The hip could be rotated externally 90 degrees at the knee and placing a bone hook behind the femoral neck it could not be dislocated anteriorly. The leg length was equal as measured by XRay and equivalent offset. These components were selected for implantation. All trial components were removed. The femur was Simpulse lavaged, and the appropriately sized femoral stem was impacted down the femur, with excellent purchase and rotational stability. The appropriately sized femoral head was Brown tapered on top of the femoral component, reduced into the socket, and the patient had the exact same stability characteristics and leg lengths as noted previously. The wound was irrigated out. The fascia of the tensor muscle was closed with a running locking #1 Vicryl. The skin was closed with inverted 2-0 Vicryls and then dermabonded ( Dermabond Luis Miguel ) in 2 layers. The wound was dressed with a Mepilex dressing. The 2 small stab incisions used for the [employer] system were Dermabonded closed.  The patient returned to the recovery room awake and in stable condition. All instrument, sponge, and needle counts were correct. During multiple steps in the procedure the simpulse lavage was used with a 500cc bag of normal saline with 30cc of 5% sterile opthalmologic povidone solution ( .30% ). Before component implantation the bone was irrigated  with normal saline on a bulb syringe. At the end of the case another 500cc normal saline bag (with 50,000 units of bacitracin and 500,000 units of polymixin )was attached to the simpulse lavage and the entire wound reirrgated before closure. A physician assistant was used during the surgery which contributes to better patient outcomes by decreasing operating room time and decreasing the amount of anesthesia the patient had to undergo. The physician assistant helped with positioning and draping of the patient for implantation of implants, retraction of soft tissues so as not to traumatize the tissues. During several parts of the procedure the PA used the Simpulse lavage to irrigate/suction the sterile field which contributed to a  surgical field and decrease in infection rates. The physician assistant used the cauterization under my guidance to decrease blood loss which limits the need for blood transfusion in the post operative period. The physician assistant instilled local anesthetic which decreased the need for post operative narcotics. During closure the physician assistant was able to close the fascia layer independently using a running #1 Vicryl stitch ensuring a water tight fascia closure and decreasing the risk of deep robert-prosthetic infection. The superficial tissues were closed using inverted 2-0 Vicryl sutures by the physician assistant as well. The skin was closed using an Exofin skin closure system so that there was no discharge from the incision. This helps contribute to a lower risk of infection.   During the procedure the physician assistant was given the task of irrigating the wound allowing myself to prepare the prosthesis for implantation.        Rashad Bautista MD  3/15/2023

## 2023-03-15 NOTE — PERIOP NOTE
TRANSFER - IN REPORT:    Verbal report received from OR nurse and CRNA on Marchelle Fat  being received from OR for routine post-op      Report consisted of patient's Situation, Background, Assessment and   Recommendations(SBAR). Information from the following report(s) Nurse Handoff Report, Adult Overview, Surgery Report, Intake/Output, MAR, Recent Results, and Med Rec Status was reviewed with the receiving nurse. Opportunity for questions and clarification was provided. Assessment completed upon patient's arrival to unit and care assumed.

## 2023-03-15 NOTE — ANESTHESIA POSTPROCEDURE EVALUATION
Post-Anesthesia Evaluation and Assessment    Cardiovascular Function/Vital Signs/Pain Score:  Vitals  BP: (!) 101/53  Temp: 99.7 °F (37.6 °C)  Temp Source: Temporal  Heart Rate: 79  Resp: 14  SpO2: 98 %  Height: 5' 7\" (170.2 cm)  Weight: 174 lb 11.2 oz (79.2 kg)  Pain Level: 0    Patient is status post Procedure(s):  LEFT TOTAL HIP ARTHROPLASTY ANTERIOR APPROACH WITH C-ARM. Nausea/Vomiting: Controlled. Postoperative hydration reviewed and adequate. Pain:  Managed    Mental Status and Level of Consciousness: Baseline and appropriate for discharge. Adequate oxygenation and airway patent. Complications related to anesthesia: None    Post-anesthesia assessment completed. No concerns. Patient has met all discharge requirements.     Signed By: Ezio Felder MD    March 15, 2023

## 2023-03-15 NOTE — PROGRESS NOTES
Occupational Therapy Goals:  Initiated 3/15/2023 to be met within 7 days. Patient will perform toileting with mod I  Patient will perform lower body dressing with mod I and A/E PRN  Patient will perform bathroom mobility with mod I  Patient will perform grooming standing at sink with mod I  Patient will perform toilet transfer with mod I  OCCUPATIONAL THERAPY EVALUATION    Patient: Mary Jane Ibarra (71 y.o. female)  Date: 3/15/2023  Primary Diagnosis: Primary osteoarthritis of left hip [M16.12]  Osteoarthritis of left hip, unspecified osteoarthritis type [M16.12]  Procedure(s) (LRB):  LEFT TOTAL HIP ARTHROPLASTY ANTERIOR APPROACH WITH C-ARM (Left) Day of Surgery   Precautions: Fall Risk, Weight Bearing,  , Left Lower Extremity Weight Bearing: Weight Bearing As Tolerated,  ,  ,  ,  ,    PLOF: pt independent for ADLs/functional mobility    ASSESSMENT :  Based on the objective data described below, the patient presents with LLE decreased ROM and strength affecting LE ADLs. Pt found seated in recliner chair, vitals assessed and WNL, pt reporting pain 5/10, agreeable to therapy. Educated pt on proper body mechanics for ADLs s/p THR. Pt completed upper body dressing with supervision. Pt able to thread B feet through underwear/pants without assist, and CGA when standing to pull up to waist. Pt required CGA/SBA for STS/bathroom mobility/toilet transfer with vc for safe use of RW. Pt ambulated back to recliner, ice applied to L hip. Friend present during session for education on home safety. Provided opportunity for pt to voice questions on ADL performance when home, pt has no further concerns. Patient will benefit from skilled Occupational Therapy intervention to maximize safety/independence with ADLs at d/c.    DEFICITS/IMPAIRMENTS:  Performance deficits / Impairments: Decreased functional mobility ; Decreased high-level IADLs;Decreased ADL status; Decreased ROM; Decreased strength;Decreased balance    Patient will benefit from skilled intervention to address the above impairments. Patient's rehabilitation potential is considered to be Prognosis: Good. Factors which may influence rehabilitation potential include:   []             None noted  []             Mental ability/status  []             Medical condition  []             Home/family situation and support systems  []             Safety awareness  [x]             Pain tolerance/management  []             Other:      PLAN :  Recommendations and Planned Interventions:   Strengthening;ROM;Balance training;Functional mobility training; Endurance training; Safety education & training;Patient/Caregiver education & training;Self-Care / ADL    Frequency/Duration: Patient will be followed by occupational therapy to address goals, 1-2 times per day/3-5 days per week to address goals. Further Equipment Recommendations for Discharge: n/a    Discharge Recommendation: Discharge Recommendations: Home with Home health OT    AMPAC: Current research shows that an AM-PAC score of 18 or greater is associated with a discharge to the patient's home setting. Based on an AM-PAC score of 19/24 and their current ADL deficits; it is recommended that the patient have 2-3 sessions per week of Occupational Therapy at d/c to increase the patient's independence. This AMPAC score should be considered in conjunction with interdisciplinary team recommendations to determine the most appropriate discharge setting. Patient's social support, diagnosis, medical stability, and prior level of function should also be taken into consideration.      SUBJECTIVE:   Patient stated \"I'm waiting for my tylenol before I get dressed    OBJECTIVE DATA SUMMARY:     Past Medical History:   Diagnosis Date    Anxiety     Arthritis     cervical OA  with radiculapthy    Central spinal stenosis     Dysthymia     Hypercholesteremia     Hyperlipidemia     Hypertension     Hypothyroid     Lumbar spondylolysis     Lupus ([facility] [address]     PONV (postoperative nausea and vomiting)     severe nausea even after zofran phenergan and scopolamine    SLE (systemic lupus erythematosus) ([facility] [address] 2012    Unspecified adverse effect of anesthesia     hypotension     Past Surgical History:   Procedure Laterality Date    BUNIONECTOMY Bilateral     bilateral    CATARACT EXTRACTION Left     COLONOSCOPY      FACIAL COSMETIC SURGERY  03/01/2023    FINGER TRIGGER RELEASE      HYSTERECTOMY (CERVIX STATUS UNKNOWN)      LYMPH NODE BIOPSY      SKIN BIOPSY       Barriers to Learning/Limitations: physical  Compensate with: visual, verbal, tactile, kinesthetic cues/model    Home Situation:   Social/Functional History  Lives With: Alone  Type of Home: House  Home Layout: One level  Home Access: Stairs to enter with rails  Entrance Stairs - Number of Steps: 4  Entrance Stairs - Rails: Right  Bathroom Shower/Tub: Walk-in shower  Home Equipment: Walker, rolling, Grab bars  ADL Assistance: Independent  Ambulation Assistance: Independent  Transfer Assistance: Independent  []  Right hand dominant   []  Left hand dominant    Cognitive/Behavioral Status:  Orientation Level: Oriented to place;Oriented to situation;Oriented to person  WNL    Coordination: BUE  Coordination: Within functional limits    Balance:  Balance  Sitting: Intact  Standing: Impaired  Standing - Static: Good;Constant support  Standing - Dynamic: Fair;Constant support    Strength: BUE  Strength: Generally decreased, functional    Tone & Sensation: BUE  Tone: Normal  Sensation: Impaired (L hip)    Range of Motion: BUE  AROM: Generally decreased, functional  PROM: Generally decreased, functional    Functional Mobility and Transfers for ADLs:  Transfers:  Transfer Training  Transfer Training: Yes  Sit to Stand: Contact-guard assistance  Stand to Sit: Contact-guard assistance;Stand-by assistance  Toilet Transfer: Contact-guard assistance;Stand-by assistance    ADL Assessment:   Feeding: Independent  Grooming: Stand by assistance  UE Bathing: Supervision  LE Bathing: Contact guard assistance  UE Dressing: Supervision  LE Dressing: Contact guard assistance  Toileting: Stand by assistance    Pain:  Pain level pre-treatment: 10   Pain level post-treatment: 4/10   Pain Intervention(s): Rest, Ice, Repositioning   Response to intervention: Nurse notified    Activity Tolerance:   Activity Tolerance: Patient Tolerated treatment well  Please refer to the flowsheet for vital signs taken during this treatment. After treatment:   [x] Patient left in no apparent distress sitting up in chair  [] Patient left in no apparent distress in bed  [] Call bell left within reach  [] Nursing notified  [x] Caregiver present  [] Bed alarm activated    COMMUNICATION/EDUCATION:   Patient Education  Education Given To: Patient;Caregiver  Education Provided: Role of Therapy; Energy Conservation; Fall Prevention Strategies; Plan of Care;IADL Safety;Precautions; Family Education; ADL Adaptive Strategies;Transfer Training  Education Method: Demonstration;Verbal  Barriers to Learning: None    Thank you for this referral.  Hari Wilson OT  Minutes: 34    Eval Complexity: [facility] AM-PAC® Daily Activity Inpatient Short Form (6-Clicks)*    How much HELP from another person does the patient currently need    (If the patient hasn't done an activity recently, how much help from another person do you think he/she would need if he/she tried?)   Total (Total A or Dep)   A Lot  (Mod to Max A)   A Little (Sup or Min A)   None (Mod I to I)   Putting on and taking off regular lower body clothing? [] 1 [] 2 [x] 3 [] 4   2. Bathing (including washing, rinsing,      drying)? [] 1 [] 2 [x] 3 [] 4   3. Toileting, which includes using toilet, bedpan or urinal?   [] 1 [] 2 [x] 3 [] 4   4. Putting on and taking off regular upper body clothing? [] 1 [] 2 [x] 3 [] 4   5. Taking care of personal grooming such as brushing teeth?    [] 1 [] 2 [x] 3 [] 4   6. Eating meals? [] 1 [] 2 [] 3 [x] 4     Current research shows that an AM-PAC score of 18 or greater is associated with a discharge to the patient's home setting. Based on an AM-PAC score of 19/24 and their current ADL deficits; it is recommended that the patient have 2-3 sessions per week of Occupational Therapy at d/c to increase the patient's independence.

## 2023-03-15 NOTE — ANESTHESIA PRE PROCEDURE
Department of Anesthesiology  Preprocedure Note       Name:  Milana Conner   Age:  69 y.o.  :  1955                                          MRN:  [id number]         Date:  3/15/2023      Surgeon: Chelo Morgan):  Jaclyn Abraham MD    Procedure: Procedure(s):  LEFT TOTAL HIP ARTHROPLASTY ANTERIOR APPROACH WITH C-ARM    Medications prior to admission:   Prior to Admission medications    Medication Sig Start Date End Date Taking? Authorizing Provider   levothyroxine (SYNTHROID) 75 MCG tablet Take 75 mcg by mouth Daily    Historical Provider, MD   losartan (COZAAR) 100 MG tablet Take 100 mg by mouth daily    Historical Provider, MD   amLODIPine (NORVASC) 10 MG tablet Take 10 mg by mouth nightly    Historical Provider, MD   calcitRIOL (ROCALTROL) 0.25 MCG capsule Take 0.25 mcg by mouth daily    Historical Provider, MD   hydroCHLOROthiazide (HYDRODIURIL) 25 MG tablet Take 25 mg by mouth as needed    Historical Provider, MD   Potassium Chloride (KCL-20 PO) Take 10 Units by mouth    Historical Provider, MD   sertraline (ZOLOFT) 100 MG tablet Take 100 mg by mouth daily    Historical Provider, MD   ALPRAZolam (XANAX) 1 MG tablet Take 1 mg by mouth nightly as needed for Sleep.     Historical Provider, MD   hydroxychloroquine (PLAQUENIL) 200 MG tablet Take 200 mg by mouth daily    Ar Automatic Reconciliation   rosuvastatin (CRESTOR) 10 MG tablet Take 10 mg by mouth nightly    Ar Automatic Reconciliation       Current medications:    Current Facility-Administered Medications   Medication Dose Route Frequency Provider Last Rate Last Admin    tranexamic acid (LYSTEDA) tablet 1,950 mg  1,950 mg Oral 60 Min Pre-Op Anali Knight PA-C   1,950 mg at 03/15/23 0636    lactated ringers IV soln infusion 1,000 mL  1,000 mL IntraVENous Continuous Geovany Knight PA-C        sodium chloride flush 0.9 % injection 5-40 mL  5-40 mL IntraVENous 2 times per day Jose Alfredo Quinonez PA-C        sodium chloride flush 0.9 % injection 5-40 mL  5-40 mL IntraVENous PRN Gia Knight PA-C        0.9 % sodium chloride infusion   IntraVENous PRN Gia Knight PA-C        ceFAZolin (ANCEF) 2000 mg in sterile water 20 mL IV syringe  2,000 mg IntraVENous On Call to 155 Jalen Rubio PA-C        sodium chloride flush 0.9 % injection 5-40 mL  5-40 mL IntraVENous 2 times per day Leonard Butler MD        sodium chloride flush 0.9 % injection 5-40 mL  5-40 mL IntraVENous PRN Leonard Butler MD        0.9 % sodium chloride infusion   IntraVENous PRN Leonard Butler MD        lactated ringers IV soln infusion   IntraVENous Continuous Jessica Preston REHMAN  mL/hr at 03/15/23 0652 New Bag at 03/15/23 8210       Allergies: Allergies   Allergen Reactions    Azithromycin Rash     Total body slothing     Lidocaine Rash     Swelling of all of the sutures. Because of that ruptured sutures w/in 20  min.      Filemon Sheridan with marcaine    Penicillins Rash       Problem List:    Patient Active Problem List   Diagnosis Code    Loose body in hand joint M24.049    Lupus ([address] M32.9    Hypertension I10    Osteoarthritis of left hip M16.12    Osteoarthritis of left hip, unspecified osteoarthritis type M16.12       Past Medical History:        Diagnosis Date    Anxiety     Arthritis     cervical OA  with radiculapthy    Central spinal stenosis     Dysthymia     Hypercholesteremia     Hyperlipidemia     Hypertension     Hypothyroid     Lumbar spondylolysis     Lupus ([address]     PONV (postoperative nausea and vomiting)     severe nausea even after zofran phenergan and scopolamine    SLE (systemic lupus erythematosus) ([address] 2012    Unspecified adverse effect of anesthesia     hypotension       Past Surgical History:        Procedure Laterality Date    BUNIONECTOMY Bilateral     bilateral    CATARACT EXTRACTION Left     COLONOSCOPY      FACIAL COSMETIC SURGERY  03/01/2023    FINGER TRIGGER RELEASE      HYSTERECTOMY (CERVIX STATUS UNKNOWN)      LYMPH NODE BIOPSY      SKIN BIOPSY         Social History:    Social History     Tobacco Use    Smoking status: Never    Smokeless tobacco: Not on file   Substance Use Topics    Alcohol use: Yes     Alcohol/week: 2.5 standard drinks     Comment: liquor twice a month                                Counseling given: Not Answered      Vital Signs (Current):   Vitals:    03/15/23 0556 03/15/23 0604   BP:  138/79   Pulse:  70   Resp:  16   Temp:  98.1 °F (36.7 °C)   TempSrc:  Temporal   SpO2:  100%   Weight: 174 lb 11.2 oz (79.2 kg)    Height: 5' 7\" (1.702 m)                                               BP Readings from Last 3 Encounters:   03/15/23 138/79       NPO Status:                                                                            Date of last solid food consumption: 03/14/23    BMI:   Wt Readings from Last 3 Encounters:   03/15/23 174 lb 11.2 oz (79.2 kg)   03/06/23 170 lb (77.1 kg)     Body mass index is 27.36 kg/m². CBC: No results found for: WBC, RBC, HGB, HCT, MCV, RDW, PLT    CMP: No results found for: NA, K, CL, CO2, BUN, CREATININE, GFRAA, AGRATIO, LABGLOM, GLUCOSE, GLU, PROT, CALCIUM, BILITOT, ALKPHOS, AST, ALT    POC Tests: No results for input(s): POCGLU, POCNA, POCK, POCCL, POCBUN, POCHEMO, POCHCT in the last 72 hours. Coags: No results found for: PROTIME, INR, APTT    HCG (If Applicable): No results found for: PREGTESTUR, PREGSERUM, HCG, HCGQUANT     ABGs: No results found for: PHART, PO2ART, EZD5ZQV, ECZ2GES, BEART, R9PUIONJ     Type & Screen (If Applicable):  No results found for: LABABO, LABRH    Drug/Infectious Status (If Applicable):  No results found for: HIV, HEPCAB    COVID-19 Screening (If Applicable): No results found for: COVID19        Anesthesia Evaluation  Patient summary reviewed and Nursing notes reviewed   history of anesthetic complications: PONV.   Airway: Mallampati: I  TM distance: >3 FB   Neck ROM: full  Mouth opening: > = 3 FB   Dental:    (+) caps      Pulmonary:Negative Pulmonary ROS                              Cardiovascular:  Exercise tolerance: good (>4 METS),   (+) hypertension:, hyperlipidemia                  Neuro/Psych:   (+) depression/anxiety    (-) psychiatric history           GI/Hepatic/Renal: Neg GI/Hepatic/Renal ROS            Endo/Other:    (+) hypothyroidism: arthritis:., .                  ROS comment: SLE Abdominal:             Vascular: negative vascular ROS. Other Findings:           Anesthesia Plan      spinal     ASA 2             Anesthetic plan and risks discussed with patient. Plan discussed with CRNA. Attending anesthesiologist reviewed and agrees with Preprocedure content        Spinal - risks/benefits explained: infection, nerve injury, bleeding, headache, back pain, failed spinal which would necessitate GA - she understands and wishes to proceed. Allergic to lidocaine. .. [employer] [place] need to use mepivacaine for spinal skin wheal.    Roosevelt Ospina MD   3/15/2023

## 2023-03-15 NOTE — INTERVAL H&P NOTE
Update History & Physical    The patient's History and Physical of March 13, the surgery was reviewed with the patient and I examined the patient. There was no change. The surgical site was confirmed by the patient and me. Plan: The risks, benefits, expected outcome, and alternative to the recommended procedure have been discussed with the patient. Patient understands and wants to proceed with the procedure.      Electronically signed by Elizabeth Lucia MD on 3/15/2023 at 7:25 AM

## 2023-03-15 NOTE — PERIOP NOTE
TRANSFER - IN REPORT:    Verbal report received from Adina Carrillo  on Cami Lawrence  being received from PACU for routine post-op      Report consisted of patient's Situation, Background, Assessment and   Recommendations(SBAR). Information from the following report(s) Nurse Handoff Report, Surgery Report, and MAR was reviewed with the receiving nurse. Opportunity for questions and clarification was provided. Assessment completed upon patient's arrival to unit and care assumed.

## 2023-03-15 NOTE — ANESTHESIA PROCEDURE NOTES
Spinal Block    Patient location during procedure: OR  End time: 3/15/2023 8:32 AM  Reason for block: primary anesthetic and at surgeon's request  Staffing  Performed: other anesthesia staff and resident/CRNA   Anesthesiologist: Darin Villatoro MD  Resident/CRNA: JONY Jc - NIEVES  Other anesthesia staff: Stephanie Garcia RN  Spinal Block  Patient position: sitting  Prep: Betadine  Patient monitoring: cardiac monitor, continuous pulse ox and frequent blood pressure checks  Approach: midline  Location: L3/L4  Provider prep: sterile gloves and mask  Anesthesia block local: mepivacaine 1.5%  Needle  Needle type: Pencan   Needle gauge: 25 G  Needle length: 3.5 in  Assessment  Sensory level: T8  Swirl obtained: Yes  CSF: clear  Attempts: 1  Hemodynamics: stable  Additional Notes  Spinal inserted by Allison Edgar.  CRNA and Anesthesiologist present for procedure  Preanesthetic Checklist  Completed: patient identified, IV checked, site marked, risks and benefits discussed, surgical/procedural consents, equipment checked, pre-op evaluation, timeout performed, anesthesia consent given, oxygen available, monitors applied/VS acknowledged, fire risk safety assessment completed and verbalized and blood product R/B/A discussed and consented

## 2023-03-15 NOTE — PROGRESS NOTES
Physical Therapy Goals:  Pt goals to be met in 1 day:  Pt will be able to perform supine<>sit SBA for transfer ease at home. Pt will be able to perform sit<>stand SBA for increased ability to transfer at home safely. Pt will be able to participate in gait training >100' w/ RW, WBAT, GB and CGA/SBA for improved ability in home upon d/c. Pt will be able to perform stair training step to pattern, B/U rail and CGA to obtain safe entry into home upon d/c. Pt will be educated regarding HEP per MD protocol for optimal AROM/strength outcomes. [x]  Patient has met MD mobilization critieria for d/c home   [x]  Recommend HH with 24 hour adult care   []  Benefit from additional acute PT session to address:      PHYSICAL THERAPY EVALUATION    Patient: Wolf Coreas (8 y.o. female)  Date: 3/15/2023  Primary Diagnosis: Primary osteoarthritis of left hip [M16.12]  Osteoarthritis of left hip, unspecified osteoarthritis type [M16.12]  Procedure(s) (LRB):  LEFT TOTAL HIP ARTHROPLASTY ANTERIOR APPROACH WITH C-ARM (Left) Day of Surgery   Precautions: Weight Bearing, Fall Risk,  , Left Lower Extremity Weight Bearing: Weight Bearing As Tolerated,  ,  ,  ,  ,    PLOF: Independent    ASSESSMENT :  Based on the objective data described below, the patient presents with decreased mobility in regards to bed mobility, transfers, gait quality, gait tolerance, balance, stair negotiation and safety due to L JOSE surgery. Decreased AROM of L hip, dec strength L hip, pain in L hip, decreased sensation of L hip, also impacting functional mobility. Using numerical pain scale, pt rated pain 4/10  pre/during/post session. Pt and caregiver ed regarding mobility safety, WB, HEP, ice application/use, elevation, environmental safety and home safe techniques. Pt sitting in recliner upon arrival.  Able to perform sit<>stand w/ CGA/SBA. Safety vc required throughout session to reinforce safety.   Gait training using RW, GB, WBAT and CGA w/ antalgic gait pattern. Stair training using step to pattern, R rail use and CGA. Answered all questions by pt and caregiver in regards to PT and mobility. Pt left sitting in recliner w/ all needs within reach. Nurse aware of session and outcomes. Recommend HHPT w/ responsible adult care at least 24 hours upon hospital d/c.  DEFICITS/IMPAIRMENTS:   Body Structures, Functions, Activity Limitations Requiring Skilled Therapeutic Intervention: Decreased functional mobility ; Decreased safe awareness;Decreased endurance;Decreased sensation;Decreased strength;Decreased balance;Decreased coordination    Patient will benefit from skilled intervention to address the above impairments. Patient's rehabilitation potential is considered to be Therapy Prognosis: Good. Factors which may influence rehabilitation potential include:   []         None noted  []         Mental ability/status  []         Medical condition  []         Home/family situation and support systems  []         Safety awareness  [x]         Pain tolerance/management  []         Other:      PLAN :  Recommendations and Planned Interventions:   Strengthening;ROM;Balance training;Functional mobility training;Transfer training;Stair training;Gait training; Endurance training;Patient/Caregiver education & training; Therapeutic activities; Modalities; Positioning; Safety education & training    Frequency/Duration: Patient will be followed by physical therapy to address goals, 1-2 times per day/3-5 days per week to address goals. Further Equipment Recommendations for Discharge:  , No,    Discharge Recommendation: Discharge Recommendations: Home with Home health PT    AMPAC: 18/24    This AMPAC score should be considered in conjunction with interdisciplinary team recommendations to determine the most appropriate discharge setting. Patient's social support, diagnosis, medical stability, and prior level of function should also be taken into consideration.      SUBJECTIVE: Patient stated I want to you to get me dressed and then you can walk me.     OBJECTIVE DATA SUMMARY:     Past Medical History:   Diagnosis Date    Anxiety     Arthritis     cervical OA  with radiculapthy    Central spinal stenosis     Dysthymia     Hypercholesteremia     Hyperlipidemia     Hypertension     Hypothyroid     Lumbar spondylolysis     Lupus (HCC)     PONV (postoperative nausea and vomiting)     severe nausea even after zofran phenergan and scopolamine    SLE (systemic lupus erythematosus) ([facility] [address] 2012    Unspecified adverse effect of anesthesia     hypotension     Past Surgical History:   Procedure Laterality Date    BUNIONECTOMY Bilateral     bilateral    CATARACT EXTRACTION Left     COLONOSCOPY      FACIAL COSMETIC SURGERY  03/01/2023    FINGER TRIGGER RELEASE      HYSTERECTOMY (CERVIX STATUS UNKNOWN)      LYMPH NODE BIOPSY      SKIN BIOPSY       Barriers to Learning/Limitations: physical and other anesthesia  Compensate with: visual, verbal, tactile, kinesthetic cues/model  Home Situation:  Social/Functional History  Lives With: Alone  Type of Home: House  Home Layout: One level  Home Access: Stairs to enter with rails  Entrance Stairs - Number of Steps: 4  Entrance Stairs - Rails: Right  Bathroom Shower/Tub: Walk-in shower  Home Equipment: Walker, rolling, Grab bars  ADL Assistance: Independent  Ambulation Assistance: Independent  Transfer Assistance: Independent  Critical Behavior:  Orientation Level: Oriented to place;Oriented to situation;Oriented to person  WNL  Strength:    Strength: Generally decreased, functional  Tone & Sensation:   Tone: Normal  Sensation: Impaired (L hip)  Range Of Motion:  AROM: Generally decreased, functional  PROM: Generally decreased, functional  Functional Mobility:  Bed Mobility:  Transfers:  Transfer Training  Transfer Training: Yes  Sit to Stand: Contact-guard assistance  Stand to Sit: Contact-guard assistance;Stand-by assistance  Toilet Transfer: Contact-guard assistance;Stand-by assistance  Balance:   Balance  Sitting: Intact  Standing: Impaired  Standing - Static: Good;Constant support  Standing - Dynamic: Fair;Constant support  Wheelchair Mobility:   Ambulation/Gait Training:  Gait Training  Gait Training: Yes  Left Side Weight Bearing: As tolerated  Gait  Interventions: Safety awareness training; Tactile cues; Verbal cues  Base of Support: Shift to right  Speed/Grace: Slow  Step Length: Right shortened;Left shortened  Swing Pattern: Left asymmetrical;Right asymmetrical  Stance: Left decreased  Gait Abnormalities: Antalgic;Decreased step clearance; Step to gait  Distance (ft): 160 Feet  Assistive Device: Gait belt;Walker, rolling  Rail Use: Right  Number of Stairs Trained: 5  Therapeutic Exercises/Neuromuscular Re-education:     Pain:  Pain level pre-treatment: 4/10   Pain level post-treatment: 4/10   Pain Intervention(s): Medication (see MAR); Rest, Ice, Repositioning  Response to intervention: Nurse notified, See doc flow    Activity Tolerance:   Activity Tolerance: Patient tolerated evaluation without incident  Please refer to the flowsheet for vital signs taken during this treatment. After treatment:   [x]         Patient left in no apparent distress sitting up in chair  []         Patient left in no apparent distress in bed  [x]         Call bell left within reach  [x]         Nursing notified  [x]         Caregiver present  []         Bed alarm activated  []         SCDs applied    COMMUNICATION/EDUCATION:   Patient Education  Education Given To: Patient; Family  Education Provided: Role of Therapy; Fall Prevention Strategies;Transfer Training  Education Method: Demonstration;Verbal;Teach Back  Education Outcome: Verbalized understanding;Demonstrated understanding;Continued education needed    Thank you for this referral.  Carlene Man, PT  Minutes: 32      Eval Complexity: [employer] AM-PAC® Basic Mobility Inpatient Short Form (6-Clicks) Version 2    How much HELP from another person does the patient currently need    (If the patient hasn't done an activity recently, how much help from another person do you think he/she would need if he/she tried?)   Total (Total A or Dep)   A Lot  (Mod to Max A)   A Little (Sup or Min A)   None (Mod I to I)   Turning from your back to your side while in a flat bed without using bedrails? [] 1 [] 2 [x] 3 [] 4   2. Moving from lying on your back to sitting on the side of a flat bed without using bedrails? [] 1 [] 2 [x] 3 [] 4   3. Moving to and from a bed to a chair (including a wheelchair)? [] 1 [] 2 [x] 3 [] 4   4. Standing up from a chair using your arms (e.g., wheelchair, or bedside chair)? [] 1 [] 2 [x] 3 [] 4   5. Walking in hospital room? [] 1 [] 2 [x] 3 [] 4   6. Climbing 3-5 steps with a railing?+   [] 1 [] 2 [x] 3 [] 4   +If stair climbing cannot be assessed, skip item #6. Sum responses from items 1-5. Current research shows that an AM-PAC score of 18 (14 without stairs) or greater is associated with a discharge to the patient's home setting. Based on an AM-PAC score of 18/24 (or **/20 if omitting stairs) and their current functional mobility deficits, it is recommended that the patient have 2-3 sessions per week of Physical Therapy at d/c to increase the patient's independence.

## (undated) DEVICE — GOWN,SIRUS,NONRNF,SETINSLV,XL,20/CS: Brand: MEDLINE

## (undated) DEVICE — GARMENT,MEDLINE,DVT,INT,CALF,MED, GEN2: Brand: MEDLINE

## (undated) DEVICE — HANDPIECE SET WITH HIGH FLOW TIP AND SUCTION TUBE: Brand: INTERPULSE

## (undated) DEVICE — OPTIFOAM GENTLE LITE, BORDERED, 4X4: Brand: MEDLINE

## (undated) DEVICE — STRYKER PERFORMANCE SERIES SAGITTAL BLADE: Brand: STRYKER PERFORMANCE SERIES

## (undated) DEVICE — GLOVE SURG SZ 85 L12IN THK75MIL DK GRN LTX FREE

## (undated) DEVICE — SYRINGE 20ML LL S/C 50

## (undated) DEVICE — SOLUTION IRRIG 500ML 0.9% SOD CHLO USP POUR PLAS BTL

## (undated) DEVICE — SOLUTION IV 250ML 0.9% SOD CHL CLR INJ FLX BG CONT PRT CLSR

## (undated) DEVICE — NEEDLE SYR 18GA L1.5IN RED PLAS HUB S STL BLNT FILL W/O

## (undated) DEVICE — SUTURE VCRL + SZ 2-0 L36IN ABSRB UD L36MM CT-1 1/2 CIR VCP945H

## (undated) DEVICE — THE CANADY HYBRID PLASMA SCALPEL IS AN ELECTROSURGICAL PLASMA SCALPEL THAT USES AN 85MM BENDABLE PADDLE BLADE TIP. THE ELECTROSURGICAL PLASMA SCALPEL IS USED TO SIMULTANEOUSLY CUT AND COAGULATE BIOLOGICAL TISSUE.: Brand: CANADY HYBRID PLASMA PADDLE BLADE

## (undated) DEVICE — PACK PROCEDURE SURG ANTR HIP

## (undated) DEVICE — NEEDLE HYPO 22GA L1.5IN BLK S STL HUB POLYPR SHLD REG BVL

## (undated) DEVICE — APPLICATOR MEDICATED 26 CC SOLUTION HI LT ORNG CHLORAPREP

## (undated) DEVICE — GLOVE SURG SZ 75 L12IN FNGR THK79MIL GRN LTX FREE

## (undated) DEVICE — SOLUTION IV 1000ML LAC RINGERS PH 6.5 INJ USP VIAFLX PLAS

## (undated) DEVICE — 3M™ BAIR PAWS FLEX™ WARMING GOWN, SMALL, 20 PER CASE 81103: Brand: BAIR PAWS™

## (undated) DEVICE — GLOVE ORANGE PI 8   MSG9080

## (undated) DEVICE — GLOVE SURG SZ 8 L12IN THK75MIL DK GRN LTX FREE

## (undated) DEVICE — SYSTEM SKIN CLSR 22CM DERMBND PRINEO

## (undated) DEVICE — DRESSING ALG W4XL8IN AG FOAM SUPERABSORBENT SIL ANTIMIC

## (undated) DEVICE — SYRINGE MED 30ML STD CLR PLAS LUERLOCK TIP N CTRL DISP

## (undated) DEVICE — SUTURE VCRL + SZ 1 L36IN ABSRB UD L36MM CT-1 1/2 CIR VCP947H